# Patient Record
Sex: FEMALE | URBAN - METROPOLITAN AREA
[De-identification: names, ages, dates, MRNs, and addresses within clinical notes are randomized per-mention and may not be internally consistent; named-entity substitution may affect disease eponyms.]

---

## 2019-03-31 ENCOUNTER — HOSPITAL ENCOUNTER (INPATIENT)
Age: 59
LOS: 6 days | Discharge: HOME HEALTH | DRG: 057 | End: 2019-04-06
Attending: PHYSICAL MEDICINE & REHABILITATION | Admitting: PHYSICAL MEDICINE & REHABILITATION
Payer: COMMERCIAL

## 2019-03-31 PROBLEM — I63.9 ACUTE ISCHEMIC STROKE (HCC): Status: ACTIVE | Noted: 2019-03-31

## 2019-03-31 PROCEDURE — 94761 N-INVAS EAR/PLS OXIMETRY MLT: CPT

## 2019-03-31 PROCEDURE — 6370000000 HC RX 637 (ALT 250 FOR IP): Performed by: PHYSICAL MEDICINE & REHABILITATION

## 2019-03-31 PROCEDURE — 94664 DEMO&/EVAL PT USE INHALER: CPT

## 2019-03-31 PROCEDURE — 1280000000 HC REHAB R&B

## 2019-03-31 PROCEDURE — 94760 N-INVAS EAR/PLS OXIMETRY 1: CPT

## 2019-03-31 PROCEDURE — 6360000002 HC RX W HCPCS: Performed by: PHYSICAL MEDICINE & REHABILITATION

## 2019-03-31 RX ORDER — ACETAMINOPHEN 325 MG/1
650 TABLET ORAL EVERY 4 HOURS PRN
Status: DISCONTINUED | OUTPATIENT
Start: 2019-03-31 | End: 2019-04-06 | Stop reason: HOSPADM

## 2019-03-31 RX ORDER — ALBUTEROL SULFATE 90 UG/1
2 AEROSOL, METERED RESPIRATORY (INHALATION) EVERY 6 HOURS PRN
Status: DISCONTINUED | OUTPATIENT
Start: 2019-03-31 | End: 2019-04-06 | Stop reason: HOSPADM

## 2019-03-31 RX ORDER — FLECAINIDE ACETATE 100 MG/1
50 TABLET ORAL EVERY 12 HOURS SCHEDULED
Status: DISCONTINUED | OUTPATIENT
Start: 2019-03-31 | End: 2019-04-06 | Stop reason: HOSPADM

## 2019-03-31 RX ORDER — PANTOPRAZOLE SODIUM 40 MG/1
40 TABLET, DELAYED RELEASE ORAL
Status: DISCONTINUED | OUTPATIENT
Start: 2019-04-01 | End: 2019-04-06 | Stop reason: HOSPADM

## 2019-03-31 RX ORDER — HYDROCORTISONE 10 MG/1
10 TABLET ORAL DAILY
COMMUNITY

## 2019-03-31 RX ORDER — HYDROCORTISONE 10 MG/1
10 TABLET ORAL
Status: DISCONTINUED | OUTPATIENT
Start: 2019-04-01 | End: 2019-04-06 | Stop reason: HOSPADM

## 2019-03-31 RX ORDER — FLUTICASONE PROPIONATE 50 MCG
1 SPRAY, SUSPENSION (ML) NASAL DAILY
Status: DISCONTINUED | OUTPATIENT
Start: 2019-04-01 | End: 2019-04-03

## 2019-03-31 RX ORDER — FLECAINIDE ACETATE 50 MG/1
50 TABLET ORAL 2 TIMES DAILY
COMMUNITY

## 2019-03-31 RX ORDER — ATORVASTATIN CALCIUM 40 MG/1
40 TABLET, FILM COATED ORAL NIGHTLY
Status: DISCONTINUED | OUTPATIENT
Start: 2019-03-31 | End: 2019-04-06 | Stop reason: HOSPADM

## 2019-03-31 RX ORDER — FOLIC ACID 1 MG/1
1 TABLET ORAL DAILY
COMMUNITY

## 2019-03-31 RX ORDER — TRAZODONE HYDROCHLORIDE 50 MG/1
50 TABLET ORAL NIGHTLY PRN
Status: DISCONTINUED | OUTPATIENT
Start: 2019-03-31 | End: 2019-04-06 | Stop reason: HOSPADM

## 2019-03-31 RX ORDER — CLONAZEPAM 0.5 MG/1
0.5 TABLET ORAL 2 TIMES DAILY PRN
COMMUNITY

## 2019-03-31 RX ORDER — METOPROLOL SUCCINATE 25 MG/1
12.5 TABLET, EXTENDED RELEASE ORAL 2 TIMES DAILY
Status: ON HOLD | COMMUNITY
End: 2019-03-31 | Stop reason: CLARIF

## 2019-03-31 RX ORDER — HYDROCORTISONE 10 MG/1
20 TABLET ORAL DAILY
Status: DISCONTINUED | OUTPATIENT
Start: 2019-04-01 | End: 2019-04-06 | Stop reason: HOSPADM

## 2019-03-31 RX ORDER — HYDRALAZINE HYDROCHLORIDE 50 MG/1
50 TABLET, FILM COATED ORAL EVERY 6 HOURS PRN
Status: DISCONTINUED | OUTPATIENT
Start: 2019-03-31 | End: 2019-04-06 | Stop reason: HOSPADM

## 2019-03-31 RX ORDER — ACETAMINOPHEN 325 MG/1
650 TABLET ORAL EVERY 6 HOURS PRN
COMMUNITY

## 2019-03-31 RX ORDER — CLONAZEPAM 0.5 MG/1
0.5 TABLET ORAL 2 TIMES DAILY
Status: DISCONTINUED | OUTPATIENT
Start: 2019-03-31 | End: 2019-04-06 | Stop reason: HOSPADM

## 2019-03-31 RX ORDER — BUDESONIDE AND FORMOTEROL FUMARATE DIHYDRATE 160; 4.5 UG/1; UG/1
2 AEROSOL RESPIRATORY (INHALATION) 2 TIMES DAILY
COMMUNITY

## 2019-03-31 RX ORDER — ONDANSETRON 4 MG/1
8 TABLET, FILM COATED ORAL EVERY 8 HOURS PRN
Status: DISCONTINUED | OUTPATIENT
Start: 2019-03-31 | End: 2019-04-06 | Stop reason: HOSPADM

## 2019-03-31 RX ORDER — HYDROCORTISONE 10 MG/1
10 TABLET ORAL DAILY
Status: ON HOLD | COMMUNITY
End: 2019-03-31 | Stop reason: CLARIF

## 2019-03-31 RX ORDER — PROCHLORPERAZINE MALEATE 10 MG
10 TABLET ORAL EVERY 6 HOURS PRN
COMMUNITY

## 2019-03-31 RX ORDER — FOLIC ACID 1 MG/1
1 TABLET ORAL DAILY
Status: DISCONTINUED | OUTPATIENT
Start: 2019-04-01 | End: 2019-04-06 | Stop reason: HOSPADM

## 2019-03-31 RX ORDER — ATORVASTATIN CALCIUM 40 MG/1
40 TABLET, FILM COATED ORAL NIGHTLY
Status: ON HOLD | COMMUNITY
End: 2019-04-05 | Stop reason: SDUPTHER

## 2019-03-31 RX ORDER — ALBUTEROL SULFATE 90 UG/1
2 AEROSOL, METERED RESPIRATORY (INHALATION) EVERY 6 HOURS PRN
COMMUNITY

## 2019-03-31 RX ORDER — FLUTICASONE PROPIONATE 50 MCG
1 SPRAY, SUSPENSION (ML) NASAL DAILY
COMMUNITY

## 2019-03-31 RX ORDER — HYDROCORTISONE 10 MG/1
20 TABLET ORAL EVERY MORNING
COMMUNITY

## 2019-03-31 RX ORDER — MULTIVITAMIN WITH FOLIC ACID 400 MCG
1 TABLET ORAL DAILY
Status: DISCONTINUED | OUTPATIENT
Start: 2019-04-01 | End: 2019-04-06 | Stop reason: HOSPADM

## 2019-03-31 RX ORDER — LIDOCAINE AND PRILOCAINE 25; 25 MG/G; MG/G
CREAM TOPICAL PRN
COMMUNITY

## 2019-03-31 RX ADMIN — ATORVASTATIN CALCIUM 40 MG: 40 TABLET, FILM COATED ORAL at 22:15

## 2019-03-31 RX ADMIN — METOPROLOL TARTRATE 12.5 MG: 25 TABLET ORAL at 22:15

## 2019-03-31 RX ADMIN — MOMETASONE FUROATE AND FORMOTEROL FUMARATE DIHYDRATE 2 PUFF: 200; 5 AEROSOL RESPIRATORY (INHALATION) at 19:43

## 2019-03-31 RX ADMIN — ENOXAPARIN SODIUM 80 MG: 80 INJECTION SUBCUTANEOUS at 22:16

## 2019-03-31 RX ADMIN — CLONAZEPAM 0.5 MG: 0.5 TABLET ORAL at 22:16

## 2019-03-31 RX ADMIN — FLECAINIDE ACETATE 50 MG: 100 TABLET ORAL at 22:16

## 2019-03-31 ASSESSMENT — PAIN SCALES - GENERAL: PAINLEVEL_OUTOF10: 0

## 2019-03-31 NOTE — PROGRESS NOTES
After further questions, unsure if patient's answers are correct, will need to confirm with family. Able to demonstrate proper use of call light.  Patient given menu and instructed to [employer] items for meal.

## 2019-03-31 NOTE — PROGRESS NOTES
Sister here, states patient's daughter will not be in. Mother and other sister here. Patient did not [place] what items she wanted for meal, sister trying to get order. Explained to sister may need LORIN cam, sister does not want her to have, also reading fall contract in detail. Sister wants patient's daughter to have copy. Reviewed with family therapy, rehab program, discharge plan.

## 2019-03-31 NOTE — PROGRESS NOTES
Brought in by ambulance service, patient already standing at bedside, tried to review safety issues to call. Patient unable to answer questions, unclear if cognition or aphasia. Patient denies pain. Patient answering with yes/no. No family here at this time.

## 2019-03-31 NOTE — PROGRESS NOTES
Family oriented to the Call Light, Phone, TV, Thermostat, Bed Controls, Bathroom and Emergency Cord. Family verbalized and demonstrated understanding of all. Patient was also given an over view of Unit Routines for Acute Rehab, including what to wear for therapy. The patient's role in goal setting was reviewed along with an explanation of the Interdisciplinary Team meeting, the 's role in coordinating services and the Discharge Planning/Continuum of Care process. Patient Rights and Responsibilities were reviewed. Meal times were explained, including how to order food. The white board (used for communication) was pointed out emphasizing  the 3 hours/day Therapy Schedule (posted most evenings), the number (and process) for reporting grievances, and the Doctor's, Nurse's, and PCA's names. It was recommended that any family that will be care givers or any care givers the patient has, take part in therapy. There are no set visiting hours, and it was suggested that non-caregiver friends and family visitors come after therapy (at 4 PM or later) to allow patient to rest in between sessions.

## 2019-03-31 NOTE — PROGRESS NOTES
4 Eyes Skin Assessment     The patient is being assess for  Admission    I agree that 2 RN's have performed a thorough Head to Toe Skin Assessment on the patient. ALL assessment sites listed below have been assessed. Areas assessed by both nurses: Barbie Brisa  [x]   Head, Face, and Ears   [x]   Shoulders, Back, and Chest  [x]   Arms, Elbows, and Hands   [x]   Coccyx, Sacrum, and IschIum  [x]   Legs, Feet, and Heels        Does the Patient have Skin Breakdown?   No         Leonard Prevention initiated:  No   Wound Care Orders initiated:  No      [facility] nurse consulted for Pressure Injury (Stage 3,4, Unstageable, DTI, NWPT, and Complex wounds), New and Established Ostomies:  No      Nurse 1 eSignature: Electronically signed by Yuli Kemp RN on 3/31/19 at 7:08 PM    **SHARE this note so that the co-signing nurse is able to place an eSignature**    Nurse 2 eSignature: Electronically signed by Isabelle Oneill RN on 4/1/19 at 8:26 AM

## 2019-04-01 LAB
A/G RATIO: 1.5 (ref 1.1–2.2)
ALBUMIN SERPL-MCNC: 3.9 G/DL (ref 3.4–5)
ALP BLD-CCNC: 73 U/L (ref 40–129)
ALT SERPL-CCNC: 15 U/L (ref 10–40)
ANION GAP SERPL CALCULATED.3IONS-SCNC: 9 MMOL/L (ref 3–16)
AST SERPL-CCNC: 17 U/L (ref 15–37)
BASOPHILS ABSOLUTE: 0.1 K/UL (ref 0–0.2)
BASOPHILS RELATIVE PERCENT: 1 %
BILIRUB SERPL-MCNC: 0.3 MG/DL (ref 0–1)
BUN BLDV-MCNC: 11 MG/DL (ref 7–20)
CALCIUM SERPL-MCNC: 9.4 MG/DL (ref 8.3–10.6)
CHLORIDE BLD-SCNC: 105 MMOL/L (ref 99–110)
CO2: 28 MMOL/L (ref 21–32)
CREAT SERPL-MCNC: 0.5 MG/DL (ref 0.6–1.1)
EOSINOPHILS ABSOLUTE: 0.2 K/UL (ref 0–0.6)
EOSINOPHILS RELATIVE PERCENT: 4.5 %
GFR AFRICAN AMERICAN: >60
GFR NON-AFRICAN AMERICAN: >60
GLOBULIN: 2.6 G/DL
GLUCOSE BLD-MCNC: 101 MG/DL (ref 70–99)
HCT VFR BLD CALC: 34.4 % (ref 36–48)
HEMOGLOBIN: 11.7 G/DL (ref 12–16)
LYMPHOCYTES ABSOLUTE: 1.4 K/UL (ref 1–5.1)
LYMPHOCYTES RELATIVE PERCENT: 26.2 %
MCH RBC QN AUTO: 31.1 PG (ref 26–34)
MCHC RBC AUTO-ENTMCNC: 34.1 G/DL (ref 31–36)
MCV RBC AUTO: 91.3 FL (ref 80–100)
MONOCYTES ABSOLUTE: 0.5 K/UL (ref 0–1.3)
MONOCYTES RELATIVE PERCENT: 9.3 %
NEUTROPHILS ABSOLUTE: 3.2 K/UL (ref 1.7–7.7)
NEUTROPHILS RELATIVE PERCENT: 59 %
PDW BLD-RTO: 12.6 % (ref 12.4–15.4)
PLATELET # BLD: 304 K/UL (ref 135–450)
PMV BLD AUTO: 7.5 FL (ref 5–10.5)
POTASSIUM REFLEX MAGNESIUM: 4.1 MMOL/L (ref 3.5–5.1)
RBC # BLD: 3.77 M/UL (ref 4–5.2)
SODIUM BLD-SCNC: 142 MMOL/L (ref 136–145)
TOTAL PROTEIN: 6.5 G/DL (ref 6.4–8.2)
WBC # BLD: 5.4 K/UL (ref 4–11)

## 2019-04-01 PROCEDURE — 97530 THERAPEUTIC ACTIVITIES: CPT

## 2019-04-01 PROCEDURE — 94640 AIRWAY INHALATION TREATMENT: CPT

## 2019-04-01 PROCEDURE — 6370000000 HC RX 637 (ALT 250 FOR IP): Performed by: PHYSICAL MEDICINE & REHABILITATION

## 2019-04-01 PROCEDURE — 97530 THERAPEUTIC ACTIVITIES: CPT | Performed by: PHYSICAL THERAPIST

## 2019-04-01 PROCEDURE — 85025 COMPLETE CBC W/AUTO DIFF WBC: CPT

## 2019-04-01 PROCEDURE — 6360000002 HC RX W HCPCS: Performed by: PHYSICAL MEDICINE & REHABILITATION

## 2019-04-01 PROCEDURE — 97116 GAIT TRAINING THERAPY: CPT | Performed by: PHYSICAL THERAPIST

## 2019-04-01 PROCEDURE — 97162 PT EVAL MOD COMPLEX 30 MIN: CPT | Performed by: PHYSICAL THERAPIST

## 2019-04-01 PROCEDURE — 1280000000 HC REHAB R&B

## 2019-04-01 PROCEDURE — 80053 COMPREHEN METABOLIC PANEL: CPT

## 2019-04-01 PROCEDURE — 97535 SELF CARE MNGMENT TRAINING: CPT

## 2019-04-01 PROCEDURE — 97166 OT EVAL MOD COMPLEX 45 MIN: CPT

## 2019-04-01 PROCEDURE — 36415 COLL VENOUS BLD VENIPUNCTURE: CPT

## 2019-04-01 PROCEDURE — 94760 N-INVAS EAR/PLS OXIMETRY 1: CPT

## 2019-04-01 RX ADMIN — PANTOPRAZOLE SODIUM 40 MG: 40 TABLET, DELAYED RELEASE ORAL at 06:12

## 2019-04-01 RX ADMIN — FLECAINIDE ACETATE 50 MG: 100 TABLET ORAL at 08:26

## 2019-04-01 RX ADMIN — METOPROLOL TARTRATE 12.5 MG: 25 TABLET ORAL at 21:39

## 2019-04-01 RX ADMIN — THERA TABS 1 TABLET: TAB at 08:26

## 2019-04-01 RX ADMIN — MOMETASONE FUROATE AND FORMOTEROL FUMARATE DIHYDRATE 2 PUFF: 200; 5 AEROSOL RESPIRATORY (INHALATION) at 12:05

## 2019-04-01 RX ADMIN — FOLIC ACID 1 MG: 1 TABLET ORAL at 08:26

## 2019-04-01 RX ADMIN — HYDROCORTISONE 10 MG: 10 TABLET ORAL at 12:44

## 2019-04-01 RX ADMIN — HYDROCORTISONE 20 MG: 10 TABLET ORAL at 08:27

## 2019-04-01 RX ADMIN — ACETAMINOPHEN 650 MG: 325 TABLET, FILM COATED ORAL at 18:21

## 2019-04-01 RX ADMIN — ATORVASTATIN CALCIUM 40 MG: 40 TABLET, FILM COATED ORAL at 21:37

## 2019-04-01 RX ADMIN — METOPROLOL TARTRATE 12.5 MG: 25 TABLET ORAL at 08:25

## 2019-04-01 RX ADMIN — ENOXAPARIN SODIUM 80 MG: 80 INJECTION SUBCUTANEOUS at 08:26

## 2019-04-01 RX ADMIN — CLONAZEPAM 0.5 MG: 0.5 TABLET ORAL at 21:37

## 2019-04-01 RX ADMIN — MOMETASONE FUROATE AND FORMOTEROL FUMARATE DIHYDRATE 2 PUFF: 200; 5 AEROSOL RESPIRATORY (INHALATION) at 20:08

## 2019-04-01 RX ADMIN — CLONAZEPAM 0.5 MG: 0.5 TABLET ORAL at 08:25

## 2019-04-01 RX ADMIN — FLECAINIDE ACETATE 50 MG: 100 TABLET ORAL at 21:38

## 2019-04-01 RX ADMIN — ENOXAPARIN SODIUM 80 MG: 80 INJECTION SUBCUTANEOUS at 21:40

## 2019-04-01 ASSESSMENT — 9 HOLE PEG TEST
TESTTIME_SECONDS: 24.49
TESTTIME_SECONDS: 24.28

## 2019-04-01 ASSESSMENT — PAIN SCALES - GENERAL
PAINLEVEL_OUTOF10: 0
PAINLEVEL_OUTOF10: 3
PAINLEVEL_OUTOF10: 2
PAINLEVEL_OUTOF10: 2

## 2019-04-01 ASSESSMENT — PAIN DESCRIPTION - DESCRIPTORS
DESCRIPTORS: ACHING
DESCRIPTORS: ACHING

## 2019-04-01 ASSESSMENT — PAIN DESCRIPTION - LOCATION
LOCATION: GROIN
LOCATION: GROIN

## 2019-04-01 ASSESSMENT — PAIN DESCRIPTION - PAIN TYPE
TYPE: ACUTE PAIN
TYPE: ACUTE PAIN

## 2019-04-01 ASSESSMENT — PAIN DESCRIPTION - FREQUENCY
FREQUENCY: INTERMITTENT
FREQUENCY: INTERMITTENT

## 2019-04-01 NOTE — PROGRESS NOTES
Occupational Therapy   Occupational Therapy Initial Assessment  Date: 2019   Patient Name: Matias Rcihard  MRN: [id number]     : 1960    Date of Service: 2019    Discharge Recommendations:  Home with assist PRN, 24 hour supervision or assist, Continue to assess pending progress       Assessment   Performance deficits / Impairments: Decreased functional mobility ; Decreased safe awareness;Decreased high-level IADLs;Decreased ADL status; Decreased balance  Assessment: Patient was independent prior to admission and worked full time, she lives with her daughter and her family. Patient presents after experiencing a stroke and is currently receiving treatment for lung cancer. Currently she demonstrates aphasia, expressive/word finding problems and some receptive. She is able to follow commands consistently. Requires OT intervention prior to d/c home with family. Treatment Diagnosis: Decreased adl, communication, functional mobility  Prognosis: Good  Decision Making: Medium Complexity  History: see above  Exam: ADLS, 9 hole peg test, dynameter, functional mobility  Assistance / Modification: supervision  Patient Education: role of OT, rehab schedule, d/c plan  Barriers to Learning: aphasia  REQUIRES OT FOLLOW UP: Yes  Activity Tolerance  Activity Tolerance: Patient Tolerated treatment well  Safety Devices  Safety Devices in place: Not Applicable(taken to PT)           Patient Diagnosis(es): There were no encounter diagnoses. has no past medical history on file. has no past surgical history on file. Treatment Diagnosis: Decreased adl, communication, functional mobility      Restrictions  Restrictions/Precautions  Restrictions/Precautions: Fall Risk  Position Activity Restriction  Other position/activity restrictions: aphasia, exp and poss receptive    Subjective   General  Chart Reviewed:  Yes  Additional Pertinent Hx: Lung CA with bone mets (poss at C4), COPD, A fib, PE  Family / Caregiver Present: No  Referring Practitioner: Dr. Viry Pretty  Diagnosis: Stroke; L M2 occlusion and thrombectomy  Subjective  Subjective: Agreed to OT eval, shower  General Comment  Comments: Goes by Elia Medellin  Pain Assessment  Pain Assessment: 0-10  Pain Level: 0  Oxygen Therapy  SpO2: 100 %  O2 Device: None (Room air)     Social/Functional History  Social/Functional History  Lives With: Daughter(daughter, Francis Pérez (works  in home), OBED and 3 granddtrs)  Type of Home: House  Home Layout: Laundry in basement, Two level, Bed/Bath upstairs  Home Access: Stairs to enter without rails  Entrance Stairs - Number of Steps: 1  Bathroom Shower/Tub: Tub/Shower unit  ADL Assistance: Independent  Homemaking Assistance: Independent(\"anybody\" cooks)  Homemaking Responsibilities: Yes  Ambulation Assistance: Independent  Transfer Assistance: Independent  Active : Yes  Mode of Transportation: Car  Occupation: Full time employment  Type of occupation: Works, full time  [address]: out to eat, visit with sister, [facility]  Additional Comments: No DME per pt       Objective   Vision: Impaired  Vision Exceptions: Wears glasses at all times  Hearing: Within functional limits    Orientation  Overall Orientation Status: (need to formally assess- States \"hospital\", acknowledges stroke, knows name of self and family, stated date \"Jan 2, 2019\" but with aphasia)     Balance  Sitting Balance: Stand by assistance  Standing Balance: Stand by assistance  Standing Balance  Sit to stand: Stand by assistance  Stand to sit: Stand by assistance  Functional Mobility  Functional - Mobility Device: No device  Assist Level: Stand by assistance  Shower Transfers  Shower - Transfer From: (no device)  Shower - Transfer Type: To and From  Shower - Transfer To:  Standing  Shower - Technique: Ambulating  Shower Transfers: Stand by assistance  ADL  Feeding: Independent  Grooming: Supervision  UE Bathing: Stand by assistance  LE Bathing: Stand by assistance  UE Dressing: Stand by assistance  LE Dressing: Stand by assistance  Additional Comments: Stood in shower for bathing, dressed LB while standing, No LOB  Tone RUE  RUE Tone: Normotonic  Tone LUE  LUE Tone: Normotonic     Bed mobility  Supine to Sit: Stand by assistance  Transfers  Sit to stand: Stand by assistance  Stand to sit: Stand by assistance  Vision - Basic Assessment  Prior Vision: Wears glasses all the time  Cognition  Overall Cognitive Status: WFL  Cognition Comment: need cont assess due to aphasia        Sensation  Overall Sensation Status: WNL        LUE AROM (degrees)  LUE AROM : WFL  RUE AROM (degrees)  RUE AROM : WFL  LUE Strength  Gross LUE Strength: WFL  RUE Strength  Gross RUE Strength: WFL     Left Hand Strength -  (lbs)  Handle Setting 1: 63, 60, 65  Right Hand Strength -  (lbs)  Handle Setting 1: 72, 73, 85  Fine Motor Skills  Left 9 Hole Peg Test Time (secs): 24.28  Right 9 Hole Peg Test Time (secs): 24.49             Plan   Plan  Times per week: 5-6 x week  Times per day: Twice a day  Plan weeks: 1  Current Treatment Recommendations: Neuromuscular Re-education, Patient/Caregiver Education & Training, Equipment Evaluation, Education, & procurement, Self-Care / ADL, Safety Education & Training, Home Management Training, Cognitive/Perceptual Training, Functional Mobility Training         Goals  Short term goals  Time Frame for Short term goals: 1 week  Short term goal 1: Pt will be Independent with functional transfers  Short term goal 2: Pt will be Independent with functional mobility  Short term goal 3: Pt will be Independent with toileting  Short term goal 4: Pt will be Independent with bathing and dressing  Short term goal 5: Pt will be Independent with light meal prep  Short term goal 6: Pt will undergo further cognition testing/home safety evaluation/tx to meet above goals  Long term goals  Time Frame for Long term goals : STG = LTG  Patient Goals   Patient goals : \"to get better\"

## 2019-04-01 NOTE — CARE COORDINATION
ROBBIEW reviewed chart. LSW met with patient to introduce  role, initiate discussion regarding dc planning and to inform of weekly review of progress during Team Conferences to be held on . She prefers to be called Silvano Thomas. She does give permission to speak with her daughter or sister for updates on progress and dc planning. Call placed to Dgtr, Adrian Landry for this assessment. SOCIAL WORK ASSESSMENT      GOAL:    To return to dgtr's house upon discharge only if she is able to care for herself. HOME SITUATION:  Pt used to live in [place] with her spouse. Her spouse  of lung cancer a few months ago, she moved in with her dgtr, son in law and three granddgtrs. Her personal house is up for sale. 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 [employer] has paperwork from her employer and LSW provided her with worker's email address so she can forward to me to get to Dr Heather Villagran. [employer] runs a  Program in her home and states pt will need to be totally independent to return home as she will not be able to caregive to her as she is caring for the children. PRIOR LEVEL OF FUNCTIONING:       PERSONAL CARE:    Totally independent                                                                       DRIVES:   yes                                                                     FINANCES:   independent                                                                   MEALS:       independnt                          GROCERY SHOPS:      DME CURRENTLY AT HOME:  none      CURRENT HOME CARE/SERVICES:  None. PREFERRED HOME CARE:   To be determined. TEAM CONFERENCE DAY:  ROBBIEW informed dgtr of weekly review of [facility] which is a staff only event. Team will review her progress, DME recommendations and DC date. This worker will call dgtr to give updates.     [facility] reports pt is wanting to be discharged as she gets an infusion every three weeks:  Janee Mckeon and it was supposed to be given Monday, 3- but she missed it. Dgtr reports she would calm down a little bit if she were able to get this infusion which is given by Dr Yovani Box at [facility]. She does have a PCP, Dr Zaida Cook from [facility]. LSW reviewed pt's two upcoming appt:    Friday, 4-5-2019  To neurosurgery.     Monday 4-8-2019     Echo    [place], [place]     Case Management   [phone]    4/2/2019  9:59 AM

## 2019-04-01 NOTE — CONSULTS
Nutrition Assessment (Low Risk)    Type and Reason for Visit: Initial, Consult    Nutrition Recommendations:   Dys 3 diet, texture/liquid level per SLP  Will monitor nutritional adequacy, nutrition-related labs, weights, BMs, and clinical progress     Nutrition Assessment:  Patient assessed for nutritional risk. Deemed to be at low risk at this time. Will continue to monitor for changes in status.       Malnutrition Assessment:  · Malnutrition Status: No malnutrition    Nutrition Risk Level   Risk Level: Low    Nutrition Diagnosis:   · Problem: No nutrition diagnosis at this time    Nutrition Intervention:  Food and/or Delivery: Continue current diet  Nutrition Education/Counseling/Coordination of Care:  Continued Inpatient Monitoring      Electronically signed by Hedy Salazar RD, LD on 4/1/19 at 1:50 PM    Contact Number: [phone]

## 2019-04-01 NOTE — PLAN OF CARE
ARU PATIENT TREATMENT PLAN  [facility]   [address]  [address]  [phone]    Elmer Krmarissa    : 1960  Acct #: [de-identified]  MRN: [id number]   PHYSICIAN:  Ryann Navarrete MD    Rehabilitation Diagnosis:    Left MCA infarction-right hemiparesis, and aphasia-Lovenox       Metastatic adenocarcinoma of lung-keytruda      COPD-Dulera ,      A. Fib-flecainide, Lopressor      low cortisol and ACTH-continue home hydrocortisone.     HLD-Lipitor      GERD-Protonix       thyroid nodule-benign.     Bowels: Schedule Miralax + Senna S. Follow bowel movements. Enema or suppository if needed.      Bladder: Check PVR x 3. [address] if PVR > 200ml or if any volume is > 500 ml.      Pain: Tylenol is ordered prn          ADMIT DATE:3/31/2019    Patient Goals: To return home. Admitting Impairments: Decreased functional mobility ; Decreased safe awareness;Decreased high-level IADLs;Decreased ADL status; Decreased balance  Barriers: medical co-morbidities, decreased balanced, decreased insight, weakness   Participation: prior to admission patient lived with daughter and was independent, working full time. She enjoys going out to eat, visiting with her sister.        CARE PLAN     NURSING:  Elmer Ashley while on this unit will:     [x] Be continent of bowel and bladder     [x] Have an adequate number of bowel movements  [x] Urinate with no urinary retention >300ml in bladder  [] Complete bladder protocol with monroe removal  [x] Maintain O2 SATs at 92%  [x] Have pain managed while on ARU       [] Be pain free by discharge   [x] Have no skin breakdown while on ARU  [] Have improved skin integrity via wound measurements  [] Have no signs/symptoms of infection at the wound site  [x] Be free from injury during hospitalization  [x] Complete education with patient/family with understanding demonstrated for:  [x] Adjustment   [x] Other: CVA, communication needs   Nursing interventions may include bowel/bladder training, education for medical assistive devices, medication education, O2 saturation management, energy conservation, stress management techniques, fall prevention, alarms protocol, seating and positioning, skin/wound care, pressure relief instruction,dressing changes,  infection protection, DVT prophylaxis, and/or assistance with in room safety with transfers to bed, toilet, wheelchair, shower as well as bathroom activities and hygiene. Patient/caregiver education for:   [x] Disease/sustained injury/management      [x] Medication Use   [] Surgical intervention   [x] Safety   [x] Body mechanics and or joint protection   [x] Health maintenance         PHYSICAL THERAPY:  Goals:                  Short term goals  Time Frame for Short term goals: 5-7 days  Short term goal 1: transfers independent including bed mobility  Short term goal 2: ambulate community distances without AD independently  Short term goal 3: performed 12 steps with rail with MI  Short term goal 4: curb step with MI            Long term goals  Time Frame for Long term goals : STG = LTG  These goals were reviewed with this patient at the time of assessment and Miguel Murray is in agreement. Plan of Care: Pt to be seen 90   mins per day for 5-6 day/week 1 week.                    Current Treatment Recommendations: Strengthening, Balance Training, Functional Mobility Training, Transfer Training, Endurance Training, Gait Training, Stair training, Safety Education & Training, Home Exercise Program, Patient/Caregiver Education & Training, Equipment Evaluation, Education, & procurement      OCCUPATIONAL THERAPY:  Goals:             Short term goals  Time Frame for Short term goals: 1 week  Short term goal 1: Pt will be Independent with functional transfers  Short term goal 2: Pt will be Independent with functional mobility  Short term goal 3: Pt will be Independent with toileting  Short term goal 4: Pt will be Independent with bathing and dressing  Short term goal 5: Pt will be Independent with light meal prep  Short term goal 6: Pt will undergo further cognition testing/home safety evaluation/tx to meet above goals :  Long term goals  Time Frame for Long term goals : STG = LTG :    These goals were reviewed with this patient at the time of assessment and Mickey Roy is in agreement    Plan of Care:  Pt to be seen 90  mins per day for 5-6 day/week 1 week. Patient Education: role of OT, rehab schedule, d/c plan      SPEECH THERAPY: Goals will be left blank if speech is not following this patient. Goals:                                                         Short-term Goals   Pt was unable to convey goals but to get talking better. Goal 1: Pt will demonstrate improved language processing at concrete sentence level for Y/N responses with <moderate clarification  Goal 2: Pt will demonstrate improved processing for one step commands (body scheme comands; psychomotor commands) with < max visual demonstrations  Goal 3: Pt will verbally express basic biographical information wtih <max assist  Goal 4: PT will demonstrate improved word retrieval for varied naming tasks and word retrieval and thought organization for concrete concept explanation with < moderate              These goals were reviewed with this patient at the time of assessment and Mickey Roy is in agreement    Plan of Care: Pt to be seen 45-90 mins per day for 5 day/week 1 weeks. Pt will need continued therapy at d/c          CASE MANAGEMENT:  Goals:   Assist patient/family with discharge planning, patient/family counseling,   and coordination with insurance during ARU stay.       Admission Period/Goal FIM SCORES  FIM Admit/Goal Score   Eating/Swallowing 5/7   Grooming 5/7   Bathing 5/7   Upper Body Dressing 5/7   Lower Body Dressing 5/7   Toileting 5/7   Bladder [place], Accidents = FIM 6/7   Bowel  [place], Accidents = FIM 6/7   Bed/Chair Transfer 5/7   Toilet Transfer known medical history of atrial fibrillation, metastatic adenocarcinoma of the left lung, on chemotherapy who developed the onset of right-sided weakness. Diagnosis of a left middle cerebral artery stroke was made, but no TPA was given since she was on Eliquis. CTA of the brain revealed a left MCA occlusion. Patient was taken to interventional radiology for thrombectomy. MRI scan revealed a left MCA distribution infarct. Patient was started on systemic Lovenox 7 days post stroke. Her aspirin was discontinued. Patient was found to have a mass in her heart on echocardiogram in September, 2018, and this condition disappeared on repeat echo in December, 2018 after she was treated with chemotherapy and anticoagulation. Patient scheduled for a repeat OC on April 4. Bilateral lower extremity Doppler revealed no DVT. Repeat CT scan of the chest revealed her known lung cancer, and oncology recommended Lovenox for anticoagulation treatment at this time. Patient had been taking Keytruda for her lung cancer. Patient started therapies, but needs more therapy before discharged to home so we can approve her right-sided strength and self-care activities. She also needs speech for treatment of her aphasia. Patient also has diagnosis of COPD, A. Fib, GERD, and known thyroid nodule-benign. She currently is on a dysphagia 3 diet with liquids        I have reviewed this initial plan of care and agree with its contents:    Title   Name    Date    Time    Physician: Dr. Frandy Moffett, 4/2/19, 8 AM    Case Mgmt:  Suman Lucas [place]     Case Management   [phone]    4/1/2019  5:10 PM      OT: MAXIMILIAN Ramsey/RICARDO #[id number] 4/1/2019 5:10 PM      PT:Electronically signed by Fabiola Blackman PT [id number] on 4/1/2019 at 4:29 PM      RN: Ernesto Zuniga RN, CRRN 04/01/2019 12:10 pm    ST:Renu Gould,MS,CCC,SLP [id number] Speech and Language Pathologist signed 4/1/2019 at [phone] pm      : Shana Barclay     Other:

## 2019-04-01 NOTE — PLAN OF CARE
Problem: Falls - Risk of:  Goal: Will remain free from falls  Description  Will remain free from falls. Patient free from falls this shift. Fall precautions in place at all times. Bed in lowest position with two side rails up and wheels locked. Call light within reach. Patient able and agreeable to contact for safety appropriately. Outcome: Ongoing      Problem: SKIN INTEGRITY  Goal: LTG - Patient will be free from infection  Skin assessment performed each shift per protocol. Patient turns and repositions self. Continent  of bowel and bladder. Skin intact  Outcome: Ongoing      Problem: Pain:  Description  Pain management should include both nonpharmacologic and pharmacologic interventions. Goal: Pain level will decrease  Description  Pain level will decrease. No complaints of pain or discomfort noted.   Outcome: Ongoing

## 2019-04-01 NOTE — PROGRESS NOTES
Pt heart rate got to the 110's and switched to A-Flutter for about 1 min. Back to NSR. Patient oriented to person, place, time (after allowing time to think on how to word it, family present to aid in interpretation). Vital signs stable. Patient remains on room air only. Shift assessment completed. Medication administration completed without any complications. Patient is up with activity times 1 with a no device. Requiring Minimal contact assistance. Patients tolerating Dysphagia diet with  thin liquids. . Remains continent of bowel and bladder. No needs voiced at this time. Call light within reach. Will continue to monitor.

## 2019-04-01 NOTE — H&P
Department of [place] [facility]  Dr. Ifeanyi Gregory History & Physical      Patient Identification:  Vanessa Sin  : 1960  Admit date: 3/31/2019  Dictation date: 19   Attending provider: Herlinda Shepherd MD        Primary care provider: No primary care provider on file. Chief Complaint:   Patient Active Problem List   Diagnosis    Acute ischemic stroke ([facility] [address]       History of Present Illness/Hospital Course:  Patient is a 63-year-old female who is a known medical history of atrial fibrillation, metastatic adenocarcinoma of the left lung, on chemotherapy who developed the onset of right-sided weakness. Diagnosis of a left middle cerebral artery stroke was made, but no TPA was given since she was on Eliquis. CTA of the brain revealed a left MCA occlusion. Patient was taken to interventional radiology for thrombectomy. MRI scan revealed a left MCA distribution infarct. Patient was started on systemic Lovenox 7 days post stroke. Her aspirin was discontinued. Patient was found to have a mass in her heart on echocardiogram in , and this condition disappeared on repeat echo in  after she was treated with chemotherapy and anticoagulation. Patient scheduled for a repeat OC on . Bilateral lower extremity Doppler revealed no DVT. Repeat CT scan of the chest revealed her known lung cancer, and oncology recommended Lovenox for anticoagulation treatment at this time. Patient had been taking Keytruda for her lung cancer. Patient started therapies, but needs more therapy before discharged to home so we can approve her right-sided strength and self-care activities. She also needs speech for treatment of her aphasia. Patient also has diagnosis of COPD, A. Fib, GERD, and known thyroid nodule-benign. She currently is on a dysphagia 3 diet with liquids. Prior Level of Function:  Independent in self care and ADLs prior to admission.       Current Level of ADMISSION PHYSICIAN EVALUATION  The patient has agreed to being admitted to our comprehensive inpatient  rehabilitation facility consisting of at least 180 minutes of therapy a day,  5 out of 7 days a week. The patient/family has a good understanding of our discharge process. The  patient has potential to make improvement and is in need of at least two of  the following multidisciplinary therapies including but not limited to  physical, occupational, respiratory, and speech, nutritional services, wound care, and prosthetics and orthotics. Given the patients complex condition  and risk of further medical complications, rehabilitation services cannot be  safely provided at a lower level of care such as a skilled nursing facility. I have compared the patients medical and functional status at the time of the  preadmission screening and the same on this date, and there are no significant changes. By signing this document, I acknowledge that I have personally performed a  full physical examination on this patient within 24 hours of admission to  this inpatient rehabilitation facility and have determined the patient to be  able to tolerate the above course of treatment at an intensive level for a  reasonable period of time. I will be completing a detailed individualized  Plan of Care for this patient by day four of the patients stay based upon the  Preadmission Screen, this Post-Admission Evaluation, and the therapy  evaluations. Assessment and Plan:       Left MCA infarction-right hemiparesis, and aphasia-Lovenox      Metastatic adenocarcinoma of lung-keytruda     COPD-Dulera      A. Fib-flecainide, Lopressor     low cortisol and ACTH-continue home hydrocortisone. HLD-Lipitor     GERD-Protonix      thyroid nodule-benign. Bowels: Schedule Miralax + Senna S. Follow bowel movements. Enema or suppository if needed. Bladder: Check PVR x 3. [address] if PVR > 200ml or if any volume is > 500 ml.      Pain: Tylenol is ordered prn.        Cinthia Grider MD, 4/1/2019, 7:41 AM

## 2019-04-01 NOTE — PROGRESS NOTES
concerning for hilar and mediastinal invasion, inseparable from portions of descending thoracic aorta with possible lymphangitic spread of tumor consistent with known primary pulmonary malignancy. Direct correlation outside imaging would be helpful. Part solid nodule left lower lobe is concerning for primary pulmonary malignancy. Right upper lobe 2 mm nodule is indeterminate. Direct comparison with prior imaging would be helpful to assess for interval evolution. May represent pre-existing nodule or metastatic disease. · US Thyroid-Neck-Head: 3/26/2019  IMPRESSION:  Dominant solid nodule in the right thyroid lobe measuring up to 3.7 cm.  Dedicated tissue sampling is recommended. · RECENT RESULTS  MRI:3/24/2019  IMPRESSION:  Moderate sized acute left MCA distribution infarct as described. Susceptibility artifact in the left M2 segment consistent with thrombus. ·  has a past medical history of Smoking history. Primary Complaint:   · Comments: Pt reports speech is my problem  · MD referral due to Dysphasia    Assessment Impressions:  1. Cognitive Diagnosis: Ongoing assessment. Objective assessment is difficult due to severity of receptive and expressive language aphasia  2. Aphasia Diagnosis: Receptive and expressive language aphasia impacting all modalities. Pt's language processing improves with max visual demonstrations. Pt's verbal expression characterized by functional greetings and occasional automatics; but paraphasias, neologisms, perseveration impact expression of needs/wants. pt does attempt to use gestures to compensate. Pt was able to express basic biographical information via written expression. Pt however was unable to write to compensate for verbal expressive language problems. Pt does demonstrate reading processing at word/phrase level. Will initiate therapy  3.   Speech Diagnosis: Right facial asym noted during Oral Motor Exam. Pt 's speech is intelligible; but dysphasia errors Severe(max visual models)  Common Objects: Severe(auditory word/pictured object discrimination)  L/R Discrimination: Severe(max visual models warranted)    Reading Comprehension  Reading Status: Exceptions to [facility]  Words Impairment Severity: Moderate(66% BDAE written word to picture matching)  Phrases Impairment Severity: Moderate(limited to high frequency/concrete)  Sentence Impairment Severity: Severe    Expression  Primary Mode of Expression: Verbal  Verbal Expression  Verbal Expression: Exceptions to functional limits  Repetition: Severe(repetition and oral reading are markedly impaired)  Automatic Speech: (automatic greetings intact; some surface exchange is appropriate; occasional appropriate labeling of items; able to count; but perseveration for weekdays/months of year-kept counting with reduced awareness)  Confrontation: Severe  Conversation: (moderate to severe deficits with paraphasias; jargon; )    Written Expression  Dominant Hand: Right  Written Expression: Exceptions to [facility]  Copy Impairment Severity: (Pt is able to copy words; phrases; sentences with <15% errors)  Self formulation Impairment Severity: (self formulated for basic biographical information >60% accurate; self formulating phrases/sentences regarding pictured stimuli: limited to listing with dysgraphia errors)    Motor Speech  Motor Speech: Exceptions to [facility]  Intelligibility: (Speech is intelligible; but dysphasia noted)  Apraxia: (Potential; ongoing assessment)  Dysarthria : (Despite right facial weakness;  no significant phonemic distortions)  Oral Motor: Exceptions to [facility]  Labial ROM: Reduced right  Labial Symmetry: Abnormal symmetry right  Labial Strength: Reduced(right)  Lingual Symmetry: (very slight asym)  Lingual Coordination: Reduced(potential motor planning)    Pragmatics/Social Functioning  Pragmatics: Exceptions to [facility]  Affect: (frustration visible.  Pt with insight into some communication problems)    Cognition:

## 2019-04-01 NOTE — PROGRESS NOTES
Physical Therapy    Facility/Department: [address] REHAB  Initial Assessment AM and PM session  NAME: Carmen Potts  : 1960  MRN: [id number]    Date of Service: 2019    Discharge Recommendations:  Continue to assess pending progress   PT Equipment Recommendations  Equipment Needed: No    Assessment   Body structures, Functions, Activity limitations: Decreased strength;Decreased endurance;Decreased sensation;Decreased balance;Decreased functional mobility   Assessment: Patient is a 61 y/o female with Dx of L MCA distribution infarct and is currently being treated for lung cancer. Patient lives with daughter, OBED and grandchildren. Patient was independent and worked full time. Patient presents with global aphasia, requires gestures with repitition to understand commands. Patient requires no AD , transfers with SBA, ambulates 220' with no device with close SBA, with noticable decreased clearance of R LE with ER but strides are symmetrical. Patient able to perfrom 12 steps with rails with CGA including curb step. . Patient will benefit from to ARU to address higher level balance activities and strengthening to improve overall mobility to independent. Patient will see speech twice a day and physical therapy once a day. Treatment Diagnosis: decreased functional mobility  Specific instructions for Next Treatment: high level balance activities  Prognosis: Good  Decision Making: Medium Complexity  History: COPD, afib, GERD, thyroid nodule benign, metastatic adenocarcinoma of lung, L CVA  Exam: mobility, strength, balance, transfers, gait  Clinical Presentation: evolving  Patient Education: rehab expectations, goals  Barriers to Learning: global aphasia  REQUIRES PT FOLLOW UP: Yes  Activity Tolerance  Activity Tolerance: Patient Tolerated treatment well  Activity Tolerance: frustrated regarding communication limitations       Patient Diagnosis(es): There were no encounter diagnoses.      has no past medical history on file. has no past surgical history on file. Restrictions  Restrictions/Precautions  Restrictions/Precautions: Fall Risk  Position Activity Restriction  Other position/activity restrictions: aphasia, exp and poss receptive, telemetry  Vision/Hearing  Vision: Impaired  Vision Exceptions: Wears glasses at all times  Hearing: Within functional limits     Subjective  General  Chart Reviewed: Yes  Additional Pertinent Hx: Per Dr. Norbert Joyce,  26-year-old female who is a known medical history of atrial fibrillation, metastatic adenocarcinoma of the left lung, on chemotherapy who developed the onset of right-sided weakness. Diagnosis of a left middle cerebral artery stroke was made, but no TPA was given since she was on Eliquis. CTA of the brain revealed a left MCA occlusion. Patient was taken to interventional radiology for thrombectomy. MRI scan revealed a left MCA distribution infarct. Patient was started on systemic Lovenox 7 days post stroke. Her aspirin was discontinued. Patient was found to have a mass in her heart on echocardiogram in September, 2018, and this condition disappeared on repeat echo in December, 2018 after she was treated with chemotherapy and anticoagulation. Patient scheduled for a repeat OC on April 4. Bilateral lower extremity Doppler revealed no DVT. Repeat CT scan of the chest revealed her known lung cancer, and oncology recommended Lovenox for anticoagulation treatment at this time. Patient had been taking Keytruda for her lung cancer. Patient also has diagnosis of COPD, A. Fib, GERD, and known thyroid nodule-benign. Response To Previous Treatment: Not applicable  Family / Caregiver Present: No  Referring Practitioner: Dr. Kirstin Joyce  Referral Date : 03/31/19  Diagnosis: CVA  Follows Commands: Within Functional Limits  Subjective  Subjective: Patient has difficulty with expressing self, reorts no pain, unable to desciber job responsibilities.   Pain Screening  Patient Currently

## 2019-04-02 PROCEDURE — 97530 THERAPEUTIC ACTIVITIES: CPT

## 2019-04-02 PROCEDURE — 97535 SELF CARE MNGMENT TRAINING: CPT

## 2019-04-02 PROCEDURE — 92610 EVALUATE SWALLOWING FUNCTION: CPT

## 2019-04-02 PROCEDURE — 6370000000 HC RX 637 (ALT 250 FOR IP): Performed by: PHYSICAL MEDICINE & REHABILITATION

## 2019-04-02 PROCEDURE — 94761 N-INVAS EAR/PLS OXIMETRY MLT: CPT

## 2019-04-02 PROCEDURE — 97127 HC OT THER IVNTJ W/FOCUS COG FUNCJ: CPT

## 2019-04-02 PROCEDURE — 97116 GAIT TRAINING THERAPY: CPT | Performed by: PHYSICAL THERAPIST

## 2019-04-02 PROCEDURE — 92507 TX SP LANG VOICE COMM INDIV: CPT

## 2019-04-02 PROCEDURE — 97530 THERAPEUTIC ACTIVITIES: CPT | Performed by: PHYSICAL THERAPIST

## 2019-04-02 PROCEDURE — 94640 AIRWAY INHALATION TREATMENT: CPT

## 2019-04-02 PROCEDURE — 1280000000 HC REHAB R&B

## 2019-04-02 PROCEDURE — 6360000002 HC RX W HCPCS: Performed by: PHYSICAL MEDICINE & REHABILITATION

## 2019-04-02 PROCEDURE — 97127 HC SP THER IVNTJ W/FOCUS COG FUNCJ: CPT

## 2019-04-02 RX ADMIN — ATORVASTATIN CALCIUM 40 MG: 40 TABLET, FILM COATED ORAL at 21:40

## 2019-04-02 RX ADMIN — ENOXAPARIN SODIUM 80 MG: 80 INJECTION SUBCUTANEOUS at 21:40

## 2019-04-02 RX ADMIN — HYDROCORTISONE 20 MG: 10 TABLET ORAL at 08:21

## 2019-04-02 RX ADMIN — MOMETASONE FUROATE AND FORMOTEROL FUMARATE DIHYDRATE 2 PUFF: 200; 5 AEROSOL RESPIRATORY (INHALATION) at 08:16

## 2019-04-02 RX ADMIN — ACETAMINOPHEN 650 MG: 325 TABLET, FILM COATED ORAL at 10:44

## 2019-04-02 RX ADMIN — THERA TABS 1 TABLET: TAB at 08:21

## 2019-04-02 RX ADMIN — METOPROLOL TARTRATE 12.5 MG: 25 TABLET ORAL at 21:40

## 2019-04-02 RX ADMIN — FLUTICASONE PROPIONATE 1 SPRAY: 50 SPRAY, METERED NASAL at 08:24

## 2019-04-02 RX ADMIN — METOPROLOL TARTRATE 12.5 MG: 25 TABLET ORAL at 08:21

## 2019-04-02 RX ADMIN — CLONAZEPAM 0.5 MG: 0.5 TABLET ORAL at 21:40

## 2019-04-02 RX ADMIN — MOMETASONE FUROATE AND FORMOTEROL FUMARATE DIHYDRATE 2 PUFF: 200; 5 AEROSOL RESPIRATORY (INHALATION) at 20:40

## 2019-04-02 RX ADMIN — FOLIC ACID 1 MG: 1 TABLET ORAL at 08:21

## 2019-04-02 RX ADMIN — PANTOPRAZOLE SODIUM 40 MG: 40 TABLET, DELAYED RELEASE ORAL at 06:17

## 2019-04-02 RX ADMIN — ACETAMINOPHEN 650 MG: 325 TABLET, FILM COATED ORAL at 20:14

## 2019-04-02 RX ADMIN — FLECAINIDE ACETATE 50 MG: 100 TABLET ORAL at 08:21

## 2019-04-02 RX ADMIN — HYDROCORTISONE 10 MG: 10 TABLET ORAL at 13:04

## 2019-04-02 RX ADMIN — FLECAINIDE ACETATE 50 MG: 100 TABLET ORAL at 21:41

## 2019-04-02 RX ADMIN — CLONAZEPAM 0.5 MG: 0.5 TABLET ORAL at 08:21

## 2019-04-02 RX ADMIN — ENOXAPARIN SODIUM 80 MG: 80 INJECTION SUBCUTANEOUS at 08:22

## 2019-04-02 ASSESSMENT — PAIN DESCRIPTION - LOCATION
LOCATION: LEG

## 2019-04-02 ASSESSMENT — PAIN SCALES - GENERAL
PAINLEVEL_OUTOF10: 4
PAINLEVEL_OUTOF10: 5
PAINLEVEL_OUTOF10: 0
PAINLEVEL_OUTOF10: 6
PAINLEVEL_OUTOF10: 3
PAINLEVEL_OUTOF10: 0

## 2019-04-02 ASSESSMENT — PAIN DESCRIPTION - PAIN TYPE
TYPE: ACUTE PAIN

## 2019-04-02 ASSESSMENT — PAIN DESCRIPTION - DESCRIPTORS
DESCRIPTORS: ACHING

## 2019-04-02 ASSESSMENT — PAIN DESCRIPTION - ORIENTATION
ORIENTATION: RIGHT

## 2019-04-02 ASSESSMENT — PAIN DESCRIPTION - FREQUENCY
FREQUENCY: INTERMITTENT
FREQUENCY: INTERMITTENT

## 2019-04-02 ASSESSMENT — PAIN DESCRIPTION - ONSET
ONSET: GRADUAL
ONSET: GRADUAL

## 2019-04-02 ASSESSMENT — PAIN DESCRIPTION - PROGRESSION
CLINICAL_PROGRESSION: GRADUALLY WORSENING
CLINICAL_PROGRESSION: GRADUALLY IMPROVING

## 2019-04-02 ASSESSMENT — PAIN - FUNCTIONAL ASSESSMENT
PAIN_FUNCTIONAL_ASSESSMENT: PREVENTS OR INTERFERES SOME ACTIVE ACTIVITIES AND ADLS
PAIN_FUNCTIONAL_ASSESSMENT: PREVENTS OR INTERFERES SOME ACTIVE ACTIVITIES AND ADLS

## 2019-04-02 NOTE — PROGRESS NOTES
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 Stewart Daughters  Diagnosis: Stroke; L M2 occlusion and thrombectomy     Restrictions/Precautions  Restrictions/Precautions: Fall Risk        Position Activity Restriction  Other position/activity restrictions: aphasia, exp and poss receptive, telemetry    Subjective: Patient met in dept, sister present    Objective:  Pt able to write name, address, but difficulty with phone number. Seems to do somewhat better if item is written instead of verbally provided. Does best with one word cues - unable to read sentence such as \"what did you hve for lunch? \"    Able to read pill bottle and fill pill keeper for once and twice a day pills without cues    Matched 10 cards to board with good tracking skills right and left    Matched 2 pc wooden puzzle for attn to detail - initially difficulty with only 4 pr, but after repetition, able to complete 6 pr correctly without cues    Assessment: pt requires SBA for mobiity without device, follows directions for activities well. Min decreased attn to detail when matching more detailed puzzle, good scanning skill when matching cards to board.   Need to continue to assess pt's ability to be alone - lives with daughter, but will not have 24 hr supervision    Safety Device - Type of devices:  []  All fall risk precautions in place [] Bed alarm in place  [] Call light within reach [] Chair alarm in place [] Positioning belt [x] Gait belt [] Patient at risk for falls [] Left in bed [] Left in chair [] Telesitter in use [] Sitter present [] Nurse notified []  None      Therapy Time   Individual Co-treatment   Time In 1515     Time Out 1600 Minutes 45       Electronically signed by Aleksandr Araya OT on 4/2/2019 at 4:39 PM

## 2019-04-02 NOTE — PROGRESS NOTES
Department of [facility] Progress Note    Patient Identification:  Gt Covington  [id number]  : 1960  Admit date: 3/31/2019      Diagnosis:   Patient Active Problem List   Diagnosis    Acute ischemic stroke ([address]           Subjective: Pt seen this AM.  She is talking better, and able to put together sentences better this morning. She is working with speech therapy on her a aphasia. PT and OT are working on her transfers, balance, and self-care activities. Repeat labs yesterday looked good and are stable. Patient will be discussed in rehabilitation conference tomorrow. /65   Pulse 65   Temp 98 °F (36.7 °C) (Oral)   Resp 16   Ht 5' 9\" (1.753 m)   Wt 187 lb 13.3 oz (85.2 kg)   SpO2 99%   BMI 27.74 kg/m²     Last 24 hour lab  No results found for this or any previous visit (from the past 24 hour(s)). Therapy progress:  PT  Position Activity Restriction  Other position/activity restrictions: aphasia, exp and poss receptive, telemetry  Objective     Sit to Stand: Stand by assistance  Stand to sit: Stand by assistance  Bed to Chair: Stand by assistance  Device: No Device  Assistance: Stand by assistance, Contact guard assistance  Distance: 220' x 2  OT  PT Equipment Recommendations  Equipment Needed: No             SLP:   Cognitive Diagnosis: Ongoing assessment. Objective assessment is difficult due to severity of receptive and expressive langauge aphasia  Aphasia Diagnosis: Receptive and expressive language aphasia impacting all modalities. Pt's language processing improves with max visual demonstrations. Pt's verbal expression characterized by functional greetings and accasional automatics; but paraphasias, neologisms, perseverations impact expression of needs/wants. pt does attempt to use gestures to compensate. Pt was able to express basic biographical information via written expression.  Pt however was unable to write to compensate for verbal expressive

## 2019-04-02 NOTE — CARE COORDINATION
LEIGH FROM DR AILYN RASMUSSEN'S OFFICE RETURNED CALL TO STATE SHE HAS SPOKEN TO DR RASMUSSEN WHO REPORTS HE WANTS HER TO FOLLOW UP AFTER HER DISCHARGE. SHE REPORTS, PER dR RASMUSSEN, THE MEDICATION STAYS IN HER SYSTEM A LONG TIME AND SHE IS STILL GETTING THE EFFECTS OF THE MEDICATION. THEY WANT HER TO FOLLOW UP ONCE SHE IS DISCHARGED.   [facility]     Case Management   [phone]    4/2/2019  11:08 AM

## 2019-04-02 NOTE — PROGRESS NOTES
mediastinal invasion, inseparable from portions of descending thoracic aorta with possible lymphangitic spread of tumor consistent with known primary pulmonary malignancy. Direct correlation outside imaging would be helpful. Part solid nodule left lower lobe is concerning for primary pulmonary malignancy. Right upper lobe 2 mm nodule is indeterminate. Direct comparison with prior imaging would be helpful to assess for interval evolution. May represent pre-existing nodule or metastatic disease.      · US Thyroid-Neck-Head: 3/26/2019  IMPRESSION:  Dominant solid nodule in the right thyroid lobe measuring up to 3.7 cm.  Dedicated tissue sampling is recommended.     · RECENT RESULTS  MRI:3/24/2019  IMPRESSION:  Moderate sized acute left MCA distribution infarct as described. Susceptibility artifact in the left M2 segment consistent with thrombus.     ·  has a past medical history of Smoking history.     · Current Diet level:  Current Diet : Soft regular  Current Liquid Diet : Thin    Primary Complaint  Patient Complaint: pt denying problems chewing or swallowing this date/time. Pt was active with LSP at other facility and recommendation there was for Dysphagia III dental soft foods with thin liquid diet      Reason for Referral  Teresa Hill was referred for a bedside swallow evaluation to assess the efficiency of her swallow function, identify signs and symptoms of aspiration and make recommendations regarding safe dietary consistencies, effective compensatory strategies, and safe eating environment. Assessment Impression  1. Pt was seen in treatment office. Pt was oriented to self and able to participate in BSE    2. Dysphagia Diagnosis: Minimal to MIld Oropharyngeal Dysphagia characterized by prolonged mastication; lingual pumping to initiate A-P oral transit and delayed initiation of swallow. No immediate overt clinical s/s post swallow; no pt complaints post swallow; no voice quality changes post swallow. Pt did have sensation of any oral residue and was able to clear oral residue with spontaneous clearance swallow. · Will recommend diet upgrade to regular. Await MD orders if in agreement    Dysphagia Outcome Severity Scale: Level 6: Within functional limits/Modified independence     Treatment Plan  Requires SLP Intervention: Yes  Duration/Frequency of Treatment: Dysphagia 1-2 f/u session to ensure diet upgrade tolerance    Recommended Diet and Intervention  Diet Solids Recommendation: Regular  Liquid Consistency Recommendation: Thin  Recommended Form of Meds: (as desired by pt)  Therapeutic Interventions: Diet tolerance monitoring; right o-m ex    Compensatory Swallowing Strategies   Upright as possible for all oral intake;Small bites/sips;Swallow 2 times per bite/sip; Remain upright for 30-45 minutes after meals    Treatment/Goals  Dysphagia Goals: The patient will tolerate regular consistency food and thin liquid diet without observed clinical signs of aspiration; The patient/caregiver will demonstrate understanding of compensatory strategies for improved swallowing safety. General  Chart Reviewed: Yes  Respiratory Status: Room air  Follows Directions: Simple(with visual demonstrations and intermittent tactile cues)  Dentition: Some missing teeth  Prior Dysphagia History: pt denies history of problems prior to onset    Patient Positioning: Upright in chair    Consistencies Administered: Reg solid;Mechanical soft;Puree; Thin     Pain:  Pain Assessment  Patient Currently in Pain: Denies  Pain Assessment: 0-10  Pain Level: 5  Pain Type: Acute pain  Pain Location: Leg  Pain Orientation: Right  RN made aware of pt complaints and RN gave pt Tylenol    Vision/Hearing  Vision  Vision: Impaired  Vision Exceptions: Wears glasses at all times  Hearing  Hearing: Within functional limits    Oral Motor Deficits  Oral/Motor  Oral Motor: Exceptions to [facility]  Labial ROM: Reduced right  Labial Symmetry: Abnormal symmetry right  Labial Strength: Reduced  Lingual Coordination: Reduced  Velum: (fairly symmetrical during phonation of /a/)  Gag: (diminished)  Vocal Quality: (unremarkable)  Volitional Cough: Weak(dry volitional cough)  Volitional Swallow: Delayed(lingual pumping prior to volitional swallow achieved)    Oral Phase Dysfunction  Oral Phase  Oral Phase: Exceptions  Oral Phase Dysfunction  Impaired Mastication: (Prolonged but functional mastication)  Decreased Anterior to Posterior Transit: (episodic lingual pumping prior to initiation of A-P oral transit across consistencies)  Suspected Premature Bolus Loss: (potential)  Lingual/Palatal Residue: Reg solid(pt spontaneously clears oral residue)     Indicators of Pharyngeal Phase Dysfunction   Pharyngeal Phase  Pharyngeal Phase: Exceptions  Indicators of Pharyngeal Phase Dysfunction  Delayed Swallow: All     Pharyngeal Phase Comment: No overt clinical s/s of penetration or aspiration post swallow; no pt complaints post swallow; no voice quality changes post swallow    Prognosis  Prognosis  Prognosis for safe diet advancement: excellent  Individuals consulted  Consulted and agree with results and recommendations: Patient    Education  Patient Education Response: Verbalizes understanding;Needs reinforcement    Therapy Time  SLP Individual Minutes  Time In: 1030  Time Out: [place]ia[place]  Minutes: 25    Renu Gould,MS,CCC,SLP [facility]  Speech and Language Pathologist  4/2/2019 10:53 AM

## 2019-04-02 NOTE — CARE COORDINATION
ROBBIEW called dgtr, Finesse Sanders, to review pt's follow up appts. She is not sure she is comfortable with the OC/Echo to be completed here at [facility].  She will speak with Aunt and get back to this worker.   Mary Ellen Rouse [place]     Case Management   [phone]    4/2/2019  2:34 PM

## 2019-04-02 NOTE — PLAN OF CARE
Problem: Falls - Risk of:  Goal: Will remain free from falls  Description  Will remain free from falls. Pt has been free of falls this shift. Fall precautions in place: 2x bed rails up, non slip footwear, call light and personal possessions within reach. Verbalizes understanding of how and when to use call light. 4/1/2019 2048 by Panfilo Cintron RN  Outcome: Ongoing  4/1/2019 0832 by Ritika Ospina RN  Outcome: Ongoing     Problem: ACTIVITY INTOLERANCE/IMPAIRED MOBILITY  Goal: Mobility/activity is maintained at optimum level for patient  Outcome: Ongoing     Problem: SKIN INTEGRITY  Goal: LTG - Patient will be free from infection  Skin assessed, and pressure relief techniques used as needed while in chair and in bed. Position changes encouraged at least every two hours while in bed. Outcome: Ongoing  Goal: LTG - Patient will demonstrate appropriate pressure relief techniques  Outcome: Ongoing     Problem: Pain:  Goal: Pain level will decrease  Description  Pain level will decrease. Pain and discomfort being managed with nonpharmacologic and PRN analgesics per MD orders. Patient able to express presence, quality, and severity of pain using numeric pain scale and gesticulates with number of fingers. .   4/1/2019 2048 by Panfilo Cintron RN  Outcome: Ongoing  4/1/2019 0832 by Ritika Ospina RN  Outcome: Ongoing

## 2019-04-02 NOTE — PROGRESS NOTES
Occupational Therapy  Facility/Department: [address] IP REHAB  Daily Treatment Note  NAME: Elisa Kilpatrick  : 1960  MRN: [id number]    Date of Service: 2019    Discharge Recommendations:  Home with assist PRN, 24 hour supervision or assist, Continue to assess pending progress       Assessment   Performance deficits / Impairments: Decreased functional mobility ; Decreased safe awareness;Decreased high-level IADLs;Decreased ADL status; Decreased balance  Assessment: Discussed with OTR: Pt tolerated session well, completing cooking task, with SBA for balance and managing stove top burner, prep and clean up w/o difficulty. Pt completed money counting with cues, assist for more larger amount of coins to count (even when adding on paper). Pt SBA for fx'l transfers, mob. Cont with POC to maximize function. Treatment Diagnosis: Decreased adl, communication, functional mobility  Prognosis: Good  Patient Education: role of OT  REQUIRES OT FOLLOW UP: Yes  Activity Tolerance  Activity Tolerance: Patient Tolerated treatment well  Safety Devices  Safety Devices in place: Yes  Type of devices: Gait belt         Patient Diagnosis(es): There were no encounter diagnoses. has a past medical history of Smoking history. has no past surgical history on file. Restrictions  Restrictions/Precautions  Restrictions/Precautions: Fall Risk  Position Activity Restriction  Other position/activity restrictions: aphasia, exp and poss receptive, telemetry  Subjective   General  Chart Reviewed: Yes  Additional Pertinent Hx: Lung CA with bone mets (poss at C4), COPD, A fib, PE  Family / Caregiver Present: No  Referring Practitioner: Dr. Suzanne Jessica  Diagnosis: Stroke; L M2 occlusion and thrombectomy  Subjective  Subjective: Pt seen in dept.  Pt points to R leg, stating \"just a little\" for pain question  General Comment  Comments: Goes by Beena Horton  Orientation  Overall Orientation Status: (unable to assess, d/t be Independent with light meal prep  Short term goal 6: Pt will undergo further cognition testing/home safety evaluation/tx to meet above goals  Long term goals  Time Frame for Long term goals : STG = LTG  Patient Goals   Patient goals : \"to get better\"       Therapy Time   Individual Concurrent Group Co-treatment   Time In 0915         Time Out 1000         Minutes 45               Ramon REINOSO/RICARDO,080

## 2019-04-02 NOTE — PROGRESS NOTES
Patient is a 61 y/o female admitted to rehab with stroke. A/A/O x 3 with expressive aphasia. Transfers with assist of 1 no device. On dysphagia 3 diet, tolerating *well. Medications taken whole in water. On Lovenox for DVT prophylaxis. Skin intact. On room air. Has been continent of urine and stool. L Will monitor for safety.

## 2019-04-02 NOTE — DISCHARGE INSTR - COC
Continuity of Care Form    Patient Name: Teresa Hill   :  1960  MRN:  [id number]    Admit date:  3/31/2019  Discharge date:  2019    Code Status Order: Full Code   Advance Directives:   [facility] Documentation     Date/Time Healthcare Directive Type of Healthcare Directive Copy in [address] [place] [address] [address] Agent's Name Healthcare Agent's Phone Number    19 1633  No, patient does not have an advance directive for healthcare treatment -- -- -- -- --    19 1618  -- patient not sure -- -- -- -- --          Admitting Physician:  Sandra Reynoso MD  PCP: No primary care provider on file. Discharging Nurse: [facility] Unit/Room#: Y0Z-2249/3257-01  Discharging Unit Phone Number: [phone]    Emergency Contact:   Extended Emergency Contact Information  Primary Emergency Contact: Raleigh Bird  Mobile Phone: [phone]  Relation: Child  Preferred language: English   needed? No  Secondary Emergency Contact: Awilda [place]  Mobile Phone: [phone]  Relation: Brother/Sister  Preferred language: English   needed? No    Past Surgical History:  No past surgical history on file. Immunization History: There is no immunization history on file for this patient.     Active Problems:  Patient Active Problem List   Diagnosis Code    Acute ischemic stroke (HCC) I63.9       Isolation/Infection:   Isolation          No Isolation            Nurse Assessment:  Last Vital Signs: BP 99/67   Pulse 65   Temp 97.7 °F (36.5 °C) (Oral)   Resp 16   Ht 5' 9\" (1.753 m)   Wt 187 lb 13.3 oz (85.2 kg)   SpO2 100%   BMI 27.74 kg/m²     Last documented pain score (0-10 scale): Pain Level: 3  Last Weight:   Wt Readings from Last 1 Encounters:   19 187 lb 13.3 oz (85.2 kg)     Mental Status:  oriented, alert, coherent, logical and thought processes intact    IV Access:  - port to right chest    Nursing Mobility/ADLs:  Walking Independent  Transfer  Independent  Bathing  Independent  Dressing  Independent  [address] Delivery   whole    Wound Care Documentation and Therapy:        Elimination:  Continence:   · Bowel: Yes  · Bladder: Yes  Urinary Catheter: None   Colostomy/Ileostomy/Ileal Conduit: No       Date of Last BM: 4/4/2019    Intake/Output Summary (Last 24 hours) at 4/2/2019 1723  Last data filed at 4/2/2019 1343  Gross per 24 hour   Intake 240 ml   Output --   Net 240 ml     I/O last 3 completed shifts: In: 240 [P.O.:240]  Out: -     Safety Concerns: At Risk for Falls    Impairments/Disabilities:      None    Nutrition Therapy:  Current Nutrition Therapy:   - Oral Diet:  Dysphagia 3 advanced    Routes of Feeding: Oral  Liquids: No Restrictions  Daily Fluid Restriction: no  Last Modified Barium Swallow with Video (Video Swallowing Test): not done    Treatments at the Time of Hospital Discharge:   Respiratory Treatments: None  Oxygen Therapy:  is not on home oxygen therapy.   Ventilator:    - No ventilator support    Rehab Therapies: Nurse, Occupational Therapy and Speech/Language Therapy  Weight Bearing Status/Restrictions: No weight bearing restirctions  Other Medical Equipment (for information only, NOT a DME order):  walker  Other Treatments: None    Patient's personal belongings (please select all that are sent with patient):  Nishant    RN SIGNATURE:  Electronically signed by Silvana Woodard RN on 4/6/19 at 10:25 AM    CASE MANAGEMENT/SOCIAL WORK SECTION    Inpatient Status Date: 3-    Readmission Risk Assessment Score:10    Discharging to Facility/ Agency   · Name:  [facility]    · Address: [address] [place] [address], [address] [place] [address]  · Phone: [phone]  · Fax: [phone]    / signature: Angel Milian     Case Management   [phone]    4/5/2019  12:07 PM      PHYSICIAN SECTION    Prognosis: Good    Condition at Discharge: Stable    Rehab Potential (if transferring to Rehab): Good    Recommended Labs or Other Treatments After Discharge: Nurse, [address] [place] [address]    Physician Certification: I certify the above information and transfer of Susie Carlton  is necessary for the continuing treatment of the diagnosis listed and that she requires Home Care for less 30 days.      Update Admission H&P: No change in H&P    PHYSICIAN SIGNATURE:  Electronically signed by Ciara Meléndez MD on 4/4/19 at 2:48 PM

## 2019-04-02 NOTE — PLAN OF CARE
Problem: Falls - Risk of:  Goal: Will remain free from falls  Description  Will remain free from falls  4/2/2019 1019 by Lizzy Kovacs RN  Outcome: Ongoing  Note:   Pt is at risk for falls. Call light in reach. Bed in low position. Alarm on. Nonskid footwear on. Possessions in reach. Pt transfers with no device only gait belt. 4/2/2019 0512 by Josephine Hernández RN  Outcome: Ongoing  4/1/2019 2048 by Ministerio Cruz RN  Outcome: Ongoing  Goal: Absence of physical injury  Description  Absence of physical injury  4/2/2019 0512 by Josephine Hernández RN  Outcome: Ongoing     Problem: ACTIVITY INTOLERANCE/IMPAIRED MOBILITY  Goal: Mobility/activity is maintained at optimum level for patient  4/2/2019 1019 by Lizzy Kovacs RN  Outcome: Ongoing  Note:   Pt is participating in therapies. 4/1/2019 2048 by Ministerio Cruz RN  Outcome: Ongoing     Problem: COMMUNICATION IMPAIRMENT  Goal: Ability to express needs and understand communication  Outcome: Ongoing  Note:   Pt with expressive aphasia. Problem: SKIN INTEGRITY  Goal: LTG - patient will maintain/improve skin integrity through proper skin care techniques  Outcome: Ongoing  Note:   Pt is at risk for impaired skin integrity. Assess skin every shift and prn. Turn every 2 hours. Keep heels off bed. Keep skin clean and dry.    Goal: LTG - Patient will demonstrate appropriate pressure relief techniques  4/1/2019 2048 by Ministerio Cruz RN  Outcome: Ongoing     Problem: Pain:  Goal: Pain level will decrease  Description  Pain level will decrease  4/2/2019 0512 by Josephine Hernández RN  Outcome: Ongoing  4/1/2019 2048 by Ministerio Cruz RN  Outcome: Ongoing  Goal: Control of acute pain  Description  Control of acute pain  4/2/2019 0512 by Josephine Hernández RN  Outcome: Ongoing

## 2019-04-02 NOTE — CARE COORDINATION
LSW reviewed pt's follow up appts with Dr Norbert Joyce. [facility] and asked if this could be pushed back. LSW was told it was just a follow up visit. LSW rescheduled to:  Friday 4- at 9:10 am.   Desean Lee is:  [address]  4th floor, [address], [address]  Call placed to [facility]  who confirmed this appt is scheduled for 4-8-2019. Candice [place]     Case Management   [phone]    4/2/2019  10:07 AM   Call placed to Dr Bria Graves office  [phone] to inform them she is in Acute Rehab and to ask about her Keytruda infusions. The message will be passed to Laury Gill in their office who will call back.   Angel Busch     Case Management   [phone]    4/2/2019  10:16 AM

## 2019-04-02 NOTE — PLAN OF CARE
Problem: Falls - Risk of:  Goal: Will remain free from falls  Description  Will remain free from falls  Problem: Falls - Risk of:  Goal: Absence of physical injury  Description  Absence of physical injury  Outcome: Ongoing     Problem: SKIN INTEGRITY  Goal: LTG - Patient will demonstrate appropriate pressure relief techniques  Problem: Pain:    Goal: Pain level will decrease  Description  Pain level will decrease  Outcome: Ongoing

## 2019-04-02 NOTE — PROGRESS NOTES
Speech Language Pathology  ACUTE REHAB UNIT  SPEECH/LANGUAGE PATHOLOGY      [x] Daily  [x] Weekly Care Conference Note  [] Discharge    [facility]       :1960  DSX:[id number]  Rehab Dx/Hx: Acute ischemic stroke ([facility] [address] [I63.9]    Precautions: Aphasia-Communication; Medical  Home situation: Lives with dtr   Dx: [x] Aphasia  [] Dysarthria  [] Apraxia   [x] Oropharyngeal dysphagia [x] Cognitive   Impairment  [] Other:     Initial Speech Therapy Assessment Diagnosis  1. Cognitive Diagnosis: Ongoing assessment. Objective assessment is difficult due to severity of receptive and expressive langauge aphasia  2. Aphasia Diagnosis: Receptive and expressive language aphasia impacting all modalities. Pt's language processing improves with max visual demonstrations. Pt's verbal expression characterized by functional greetings and accasional automatics; but paraphasias, neologisms, perseverations impact expression of needs/wants. pt does attempt to use gestures to compensate. Pt was able to express basic biographical information via written expression. Pt however was unable to write to compensate for verbal expressive language problems. Pt does demonstrate reading processing at word/phrase level. Will intiate therapy  3. Speech Diagnosis: Right facial asym noted during Oral Motor Exam. Pt 's speech is intelligible; but dysphasia errors impact conveying needs/wants. No significant phonemic distortions  4. Dysphagia: Oropharyngeal Dysphagia characterized by prolonged, but functional mastication; variable disorganized A-P oral transit; and delayed initiation of swallow. Despite these problems; no overt clinical s/s of penetration ; no voice quality changes post swallow;no pt complaints post swallow.  Plan to request diet upgrade to regular consistency with thin liquids from dysphagia III    Date of Admit: 3/31/2019  Room #: G5D-6393/3257-01  Date: 2019       Current functional status (updated daily): notified  [] Repositioned  [] Intervention offered and patient declined  [] N/A  [] Other: [] RN notified  [] Repositioned  [] Intervention offered and patient declined  [] N/A  [] Other: [] RN notified  [] Repositioned  [] Intervention offered and patient declined  [] N/A  [] Other:   Subjective     PT seen in treatment office  Pt was receptive to assessment PT seen in treatment office  Pt was receptive to therapy with good effort PT was seen in treatment office  Sibling was present for the session  Pt was receptive to therapy with good effort   Objective:  Goals        Dysphagia Goals:  1. The patient will tolerate recommended diet without observed clinical signs of aspiration,  New goal Not targeted Pt/sibling ed in recommendation for diet upgrade. Specific examples of upgrade provided   2. The patient/caregiver will demonstrate understanding of compensatory strategies for improved swallowing safety. New goal Not targeted Pt/sibling ed in recommendation for diet upgrade and compensatory strategies   Communiation Goals:   Pt was unable to convey goals but to get talking better.         Therapy working toward pt goal Therapy working toward pt goal   Goal 1: Pt will demonstrate improved language processing at word and concrete sentence level for Y/N responses with <moderate clarification   Not targeted Pictured object/field of 3 written word discrimination: 50%; 100%; 100%    Processing of printed  Biographical information for name; address: [facility]    High Frequency functional reading at phrase/short sentence level: 40%    2-3 unit sentence description for field of 3 action picture discrimination: 50%    Sentence level directions (2-3 unit): <25%     Auditory processing of 3-4 unit questions regarding pictured stimuli: 60%   Goal 2: Pt will demonstrate improved processing for one step commands (body scheme comands; psychomotor commands) with < max visual demonstrations Not targeted Visual cues warranted; conditioning to language task warranted 1 step commands body scheme: moderate to max visual cues warranted   Goal 3: Pt will verbally express basic biographical information wtih <max assist Not targeted No data taken Written expressive language:   -IND for full name; age; ; partial address; sibling's name; Dtr's name    Verbal Expression of biographical information:   -IND for full name; age; and . IND for 1/2 fmaily member names that were targeted   Goal 4: PT will demonstrate improved word retrieval for varied naming tasks and word retrieval and thought organization for concrete concept explanation with < moderate       Not targeted Spontaneous:   -greetings  -Y/N verbal responses (at times not accurate)  -Occasional spontaneous/appropriate 2-3 word utterances  -25% CFN during reading tasks Pt/sibling ed in compensatory strategies:     -CFN (DL items): 53% with compensatory strategy    - CFN (money): 66%    -CFN via FC? response: perseveration increased . Activity paced; use of printed information; and intermittent use of VAT and/or sentence completion   Other areas targeted:   Cognitive:   -sorting money according to amounts: 100%  -counting money for amount identified: 75% IND  -oriented to date/place/stroke via use of printed [facility] format     Education:   Pt ed in BSE and recommendations Ongoing pt ed Ongoing pt/sibling ed   Safety Devices: [] Call light within reach  [] Chair alarm activated  [] Bed alarm activated  [] Other: [] Call light within reach  [] Chair alarm activated  [] Bed alarm activated  [x] Other: transport returned pt to room [] Call light within reach  [] Chair alarm activated  [] Bed alarm activated  [x] Other: transport returned pt to room   Progress Assessment: 2019: continue treatment goals. Pt responding well to therapy and structured speech tasks incorporating varied assistive strategies. Sibling here for one of the sessions for family ed    Plan: Continue as per plan of care.

## 2019-04-03 PROCEDURE — 92526 ORAL FUNCTION THERAPY: CPT

## 2019-04-03 PROCEDURE — 97127 HC SP THER IVNTJ W/FOCUS COG FUNCJ: CPT

## 2019-04-03 PROCEDURE — 97110 THERAPEUTIC EXERCISES: CPT | Performed by: PHYSICAL THERAPIST

## 2019-04-03 PROCEDURE — 97530 THERAPEUTIC ACTIVITIES: CPT

## 2019-04-03 PROCEDURE — 92507 TX SP LANG VOICE COMM INDIV: CPT

## 2019-04-03 PROCEDURE — 94760 N-INVAS EAR/PLS OXIMETRY 1: CPT

## 2019-04-03 PROCEDURE — 97535 SELF CARE MNGMENT TRAINING: CPT

## 2019-04-03 PROCEDURE — 1280000000 HC REHAB R&B

## 2019-04-03 PROCEDURE — 94640 AIRWAY INHALATION TREATMENT: CPT

## 2019-04-03 PROCEDURE — 6370000000 HC RX 637 (ALT 250 FOR IP): Performed by: PHYSICAL MEDICINE & REHABILITATION

## 2019-04-03 PROCEDURE — 97530 THERAPEUTIC ACTIVITIES: CPT | Performed by: PHYSICAL THERAPIST

## 2019-04-03 PROCEDURE — 97116 GAIT TRAINING THERAPY: CPT | Performed by: PHYSICAL THERAPIST

## 2019-04-03 PROCEDURE — 6360000002 HC RX W HCPCS: Performed by: PHYSICAL MEDICINE & REHABILITATION

## 2019-04-03 RX ADMIN — ACETAMINOPHEN 650 MG: 325 TABLET, FILM COATED ORAL at 06:21

## 2019-04-03 RX ADMIN — ATORVASTATIN CALCIUM 40 MG: 40 TABLET, FILM COATED ORAL at 20:32

## 2019-04-03 RX ADMIN — FLECAINIDE ACETATE 50 MG: 100 TABLET ORAL at 20:33

## 2019-04-03 RX ADMIN — PANTOPRAZOLE SODIUM 40 MG: 40 TABLET, DELAYED RELEASE ORAL at 06:18

## 2019-04-03 RX ADMIN — CLONAZEPAM 0.5 MG: 0.5 TABLET ORAL at 07:25

## 2019-04-03 RX ADMIN — CLONAZEPAM 0.5 MG: 0.5 TABLET ORAL at 20:32

## 2019-04-03 RX ADMIN — THERA TABS 1 TABLET: TAB at 07:24

## 2019-04-03 RX ADMIN — ENOXAPARIN SODIUM 80 MG: 80 INJECTION SUBCUTANEOUS at 07:25

## 2019-04-03 RX ADMIN — FLUTICASONE PROPIONATE 1 SPRAY: 50 SPRAY, METERED NASAL at 07:26

## 2019-04-03 RX ADMIN — ACETAMINOPHEN 650 MG: 325 TABLET, FILM COATED ORAL at 20:34

## 2019-04-03 RX ADMIN — FLECAINIDE ACETATE 50 MG: 100 TABLET ORAL at 07:25

## 2019-04-03 RX ADMIN — ENOXAPARIN SODIUM 80 MG: 80 INJECTION SUBCUTANEOUS at 20:36

## 2019-04-03 RX ADMIN — HYDROCORTISONE 10 MG: 10 TABLET ORAL at 11:36

## 2019-04-03 RX ADMIN — MOMETASONE FUROATE AND FORMOTEROL FUMARATE DIHYDRATE 2 PUFF: 200; 5 AEROSOL RESPIRATORY (INHALATION) at 08:30

## 2019-04-03 RX ADMIN — FOLIC ACID 1 MG: 1 TABLET ORAL at 07:24

## 2019-04-03 RX ADMIN — METOPROLOL TARTRATE 12.5 MG: 25 TABLET ORAL at 07:25

## 2019-04-03 RX ADMIN — ACETAMINOPHEN 650 MG: 325 TABLET, FILM COATED ORAL at 11:36

## 2019-04-03 RX ADMIN — HYDROCORTISONE 20 MG: 10 TABLET ORAL at 07:24

## 2019-04-03 RX ADMIN — MOMETASONE FUROATE AND FORMOTEROL FUMARATE DIHYDRATE 2 PUFF: 200; 5 AEROSOL RESPIRATORY (INHALATION) at 20:09

## 2019-04-03 ASSESSMENT — PAIN SCALES - GENERAL
PAINLEVEL_OUTOF10: 5
PAINLEVEL_OUTOF10: 0
PAINLEVEL_OUTOF10: 0
PAINLEVEL_OUTOF10: 2
PAINLEVEL_OUTOF10: 1
PAINLEVEL_OUTOF10: 4
PAINLEVEL_OUTOF10: 0
PAINLEVEL_OUTOF10: 4
PAINLEVEL_OUTOF10: 3

## 2019-04-03 ASSESSMENT — PAIN - FUNCTIONAL ASSESSMENT
PAIN_FUNCTIONAL_ASSESSMENT: ACTIVITIES ARE NOT PREVENTED

## 2019-04-03 ASSESSMENT — PAIN DESCRIPTION - ONSET
ONSET: ON-GOING
ONSET: ON-GOING
ONSET: GRADUAL
ONSET: GRADUAL

## 2019-04-03 ASSESSMENT — PAIN DESCRIPTION - PAIN TYPE
TYPE: ACUTE PAIN

## 2019-04-03 ASSESSMENT — PAIN DESCRIPTION - DESCRIPTORS
DESCRIPTORS: ACHING

## 2019-04-03 ASSESSMENT — PAIN DESCRIPTION - PROGRESSION
CLINICAL_PROGRESSION: NOT CHANGED
CLINICAL_PROGRESSION: GRADUALLY IMPROVING
CLINICAL_PROGRESSION: GRADUALLY IMPROVING

## 2019-04-03 ASSESSMENT — PAIN DESCRIPTION - ORIENTATION
ORIENTATION: RIGHT

## 2019-04-03 ASSESSMENT — PAIN DESCRIPTION - FREQUENCY
FREQUENCY: INTERMITTENT

## 2019-04-03 ASSESSMENT — PAIN DESCRIPTION - LOCATION
LOCATION: LEG

## 2019-04-03 ASSESSMENT — PAIN DESCRIPTION - DIRECTION: RADIATING_TOWARDS: Q

## 2019-04-03 NOTE — PROGRESS NOTES
Pt A&O x4  Cooperative with care, able to make needs known. Pain /discomfort being managed with PRN analgesics per MD orders. Patient able to express presence and absence of pain and rate pain appropriately using numerical scale. Vitals signs are stable.   Intake and output are being recorded, will continue to monitor

## 2019-04-03 NOTE — PLAN OF CARE
Pt assessed for fall risk and fall precautions put into place. Bed in lowest position and wheels locked, call light within reach. Nonskid footwear in place. Patient educated on appropriate method of transfer and to call for assistance. Patient remains free from intentional harm, no signs or symptoms of intention to harm noted. Will continue to monitor patient throughout shift. Pt able to communicate and speak during physical activity. Will continue to monitor patient to ensure communication is possible throughout activity; will decrease activity level if goal is not being met.

## 2019-04-03 NOTE — PLAN OF CARE
Problem: Falls - Risk of:  Goal: Will remain free from falls  Description  Will remain free from falls  4/3/2019 0929 by Analia Castro RN  Outcome: Ongoing  Note:   Orange band on patient. Orange light on outside of room. Non skid footwear in place. Alarms used appropriately. Patient instructed to call and wait for staff before getting up. Rounding done to anticipate needs. Appropriate safety devices used for transfers. Problem: HEMODYNAMIC STATUS  Goal: Patient has stable vital signs and fluid balance  4/3/2019 0929 by Analia Castro RN  Outcome: Ongoing  Note:   Vital signs monitored per unit routine. Monitoring intake and output. Daily weight. Dietary restrictions noted and followed. Problem: ACTIVITY INTOLERANCE/IMPAIRED MOBILITY  Goal: Mobility/activity is maintained at optimum level for patient  4/3/2019 8967 by Analia Castro RN  Outcome: Ongoing  Note:   Patient working with therapy at least 3 hours/day to obtain maximal mobility. Safety devices used.

## 2019-04-03 NOTE — CARE COORDINATION
Team Conference held today. Team reviewed progress and goals. Team reports she is working in all three therapies. She continues with expressive and receptive aphagia. Team recommends a pill box for home use. No DME is recommended. Team recommends home SN/OT/SP therapies for DC. Team recommends DC on Saturday. LSW met with patient and sister at bedside to review. They are both pleased with dc to home on Saturday. They are receptive to home care orders; provided list of agencies for their review. LSW informed them of upcoming appts for Echo/OC, neurology. Sister is aware of them. LSW informed them of [facility] paperwork was received and Dr Tyra Leone if completing them. LSW informed them of  time for Saturday is between 10 - 12 noon. LSW informed them of recommended PCP visit with in 7 days. Discussion held regarding [facility]; sister reports she is only on the same medication she was already on.  Angel Louis     Case Management   [phone]    4/3/2019  4:54 PM   Call placed to [facility] to give this information as well. She approves of this plan.   Angel Louis     Case Management   [phone]    4/3/2019  4:59 PM

## 2019-04-03 NOTE — PROGRESS NOTES
expressive aphasia  Patient Education: educated on purpose of covering R chest port, instructed w/ need to further assess vision & safety prior to DC  REQUIRES OT FOLLOW UP: Yes  Activity Tolerance  Activity Tolerance: Patient Tolerated treatment well  Activity Tolerance: denied pain or dizziness during session, OT covered R chest port w/ tegaderm prior to shower, pt was able to assist w/ starting the tape off the roll  Safety Devices  Safety Devices in place: Yes  Type of devices: Call light within reach; Chair alarm in place; Left in chair         Patient Diagnosis(es): There were no encounter diagnoses. has a past medical history of Smoking history. has no past surgical history on file. Restrictions  Restrictions/Precautions  Restrictions/Precautions: Fall Risk  Position Activity Restriction  Other position/activity restrictions: aphasia, exp and poss receptive, telemetry  Subjective   General  Chart Reviewed:  Yes  Additional Pertinent Hx: Lung CA with bone mets (poss at C4), COPD, A fib, PE  Family / Caregiver Present: No  Referring Practitioner: Dr. Suzanne Jessica  Diagnosis: Stroke; L M2 occlusion and thrombectomy  Subjective  Subjective: met in room, pt sitting in recliner eating breakfast  General Comment  Comments: denies pain, has aphasia but able to follow commands, seems very accuate w/ yes & no responses; agreeable for shower      Orientation  Orientation  Overall Orientation Status: Impaired  Orientation Level: Oriented to person;Disoriented to place(oriented to month, reversed numerically order of yr, stated \"1920\"but shook her head no )  Objective    ADL  Feeding: Independent  Grooming: Independent  UE Bathing: Setup(OT covered R chest port site w/ tegaderm)  LE Bathing: Supervision;Verbal cueing(stood for entire shower (shower chair available if needed), pt used [facility] but required A to hang it on hook, gave min VCs to sit down to dry feet)  UE Dressing: Supervision(needed help to manage cardiac monitor during UB dressing tasks, otherwise pt retrieved her clothes & donned IND'ly, VCs to doff)  LE Dressing: Verbal cueing;Supervision(pt gathered her pants, VCs to get underwear, socks & shoes; alternated sitting & standing to don clothing over hips, no LOB)  Toileting: Modified independent (used GB intermittently; able to manage clothing down, up & wipes IND'ly)  Additional Comments: Stood in shower for bathing, dressed LB while standing, No LOB or dizziness        Balance  Sitting Balance: Modified independent   Standing Balance: Supervision  Standing Balance  Time: 8 minutes  Activity: during fxl mob, ADLs  Sit to stand: Supervision  Stand to sit: Supervision  Comment: pt can be a little impulsive but no LOB occurred, not using AD during t/f & fxl mob; gathered clothing from closet & able to bend down to get item off floor  Functional Mobility  Functional - Mobility Device: No device  Activity: Retrieve items;Transport items; To/from bathroom  Assist Level: Supervision  Functional Mobility Comments: slight unsteadiness but no LOB, does not use AD for fxl mob in room, able to gather clothes from closet and transport to bathrm  Toilet Transfers  Toilet - Technique: Ambulating  Equipment Used: Grab bars  Toilet Transfer: Modified independent  Toilet Transfers Comments: used GB w/ R hand  Shower Transfers  Shower - Transfer From: Other  Shower - Transfer Type: To and From  Shower - Transfer To: Standing  Shower - Technique: Ambulating  Shower Transfers: Supervision  Shower Transfers Comments: completed t/f using GB, able to stand for duration of shower, shower chair behind her if needed  Wheelchair Bed Transfers  Wheelchair/Bed - Technique: Ambulating  Equipment Used:  Other  Level of Asssistance: Supervision  Wheelchair Transfers Comments: transfer in & out of recliner w/ Supervision, no AD used however slight unsteadiness & impulsiveness noted     Transfers  Sit to stand: Supervision  Stand to sit: Supervision  Transfer Comments: ambulates & completes t/f without AD                    Vision--completed during PM session  Vision Comment: pt denies any new problems, wears glasses AATs--pt has 95* peripheral vision B'ly; able to read eye chart to 20/30 due to aphasia, for \"L\" she would say \"Tin\", had difficulty saying \"D\" \"Z\" and \"C\" but could draw each letter using finger in the air  Cognition  Overall Cognitive Status: WNL  Cognition Comment: has expressive aphasia but yes/no responses seem accurate; did not require any VCs throughout ADLs; PM session: mild forgetfulness to navigate from therapy gym <>ortho gym. Pt completed ACLS--she was able to complete running & whip stitches w/o demo; was given 2 demo's for single cordovan--she made twisting & directional errors, her score of 4.4 indicates the need to live w/ someone who does a daily check on the environment & removes any hazards and solves new problems. Person may be left alone for part of the day & manage a daily allowance.  Pt has aphasia which will impact her ability to communicate her needs to others--she may not recognize errors unless clearly visible & may request help when mistakes are noticed, attention span is good up to 1 hr (completed during PM session)     Perception--completed during PM session  Overall Perceptual Status: WFL(has good proprioception/body awareness)                       Car Transfers--completed during PM session  Car - Transfer From: Other  Car - Technique: Ambulating  Car Transfers: Modified independence  Car Transfers: slow to process how to open car door           Plan   Plan  Times per week: 5-6 x week  Times per day: Twice a day  Plan weeks: DC FRI after PM therapies   Specific instructions for Next Treatment: car t/f, vision testing  Current Treatment Recommendations: Neuromuscular Re-education, Patient/Caregiver Education & Training, Equipment Evaluation, Education, & procurement, Self-Care / ADL, Safety Education & Training, Home Management Training, Cognitive/Perceptual Training, Functional Mobility Training, Cognitive Reorientation, Balance Training    Goals  Short term goals  Time Frame for Short term goals: 1 week  Short term goal 1: Pt will be Independent with functional transfers  Short term goal 2: Pt will be Independent with functional mobility  Short term goal 3: Pt will be Independent with toileting--goal met 4/3/19  Short term goal 4: Pt will be Independent with bathing and dressing  Short term goal 5: Pt will be Independent with light meal prep  Short term goal 6: Pt will undergo further cognition testing/home safety evaluation/tx to meet above goals  Long term goals  Time Frame for Long term goals : STG = LTG  Patient Goals   Patient goals : \"to get better\"       Therapy Time   Individual Concurrent Group PM-treatment   Time In 0855      1345   Time Out 4048      6462   Minutes 60      45   Timed Code Treatment Minutes: [address], OTR/L [address][id number]

## 2019-04-03 NOTE — PROGRESS NOTES
Patient had short run of A-fib on monitor with rate in the 140's, asymptomatic and quickly converted back to NSR. Rate now in the 60's, she is eating dinner and family is visiting. Will monitor.

## 2019-04-03 NOTE — PROGRESS NOTES
impact expression of needs/wants. pt does attempt to use gestures to compensate. Pt was able to express basic biographical information via written expression. Pt however was unable to write to compensate for verbal expressive language problems. Pt does demonstrate reading processing at word/phrase level. Will intiate therapy  Speech Diagnosis: Right facial asym noted during Oral Motor Exam. Pt 's speech is intelligible; but dysphasia errors impact conveying needs/wants. No significant phonemic distortions  Dysphagia Diagnosis: (Minimal to MIld Oropharyngeal Dysphagia characterized by prlonged mastication; lingual pumping to initiate A-P oral transit and delayed initiation of swallow. No immediate overt clinical s/s post swallow; no pt complaints post swallow; no voice qulatiy c)  Dysphagia Impression : (changes post swallow. Will rec ommend diet upgrade to regular. Await MD orders if in agreement)  Dysphagia Outcome Severity Scale: Level 6: Within functional limits/Modified independence   Assessment and Plan:       Left MCA infarction-right hemiparesis, and aphasia-Lovenox       Metastatic adenocarcinoma of lung-keytruda      COPD-Dulera ,      A. Fib-  flecainide, Lopressor      low cortisol and ACTH-continue home hydrocortisone.     HLD-Lipitor      GERD-Protonix       thyroid nodule-benign.     Bowels: Schedule Miralax + Senna S. Follow bowel movements. Enema or suppository if needed.      Bladder: Check PVR x 3.   [address] if PVR > 200ml or if any volume is > 500 ml.      Pain: Tylenol is ordered prn.      ELOS: 4/6      Reena Escobar MD, 4/3/2019, 7:30 AM

## 2019-04-03 NOTE — PROGRESS NOTES
Treatment: Patient with no complaints from previous session. Family / Caregiver Present: No  Referring Practitioner: Dr. Jomar Davis: Patient has difficulty with expressing self, reports no pain, unable to correctly state birthday but only off by 1 day.           Orientation  Orientation  Overall Orientation Status: (difficult to assess due to global aphasia - see SP - patient only one day for bday)  Cognition      Objective   Bed mobility  Bridging: Independent  Rolling to Left: Independent  Rolling to Right: Independent  Supine to Sit: Independent  Sit to Supine: Independent  Scooting: Independent  Comment: when give directions unable to follow unless demonstrate movement  Transfers  Sit to Stand: Modified independent  Stand to sit: Modified independent  Bed to Chair: Modified independent  Comment: automatic , no device,   Ambulation  Ambulation?: Yes  Ambulation 1  Surface: level tile  Device: No Device  Assistance: Supervision;Modified Independent  Quality of Gait: Patient ambulated with good hernandez, with minimally decreased clearance on R with occassional shuffle, limited weightshift over R LE with swing through of L LE, tends to ER R LE, generally steady with no loss of balance  Distance: 1250', short distances in room  Comments: 6 minue walk test = 1250', patient unable to find room required cues, patient safe no LOB  Stairs/Curb  Stairs?: Yes  Stairs  # Steps : 12  Stairs Height: 6\"  Rails: Left ascending  Curbs: 6\"  Device: No Device  Assistance: Supervision  Comment: reciprocal gait pattern on steps with B rails, curb step with light CGA and no loss of balance        Exercises  Hip Flexion: 30  Hip Abduction: 15 with blue theraband, adductor sets with Ball x 10  Knee Long Arc Quad: 15 B LE  Ankle Pumps: 20  Comments: Patient given a sitting HEP, tends to perseverate and unable correctly count, loses train of thought         Other Activities: Other (see comment)  Comment: Patient will benefit from to acute inpatient rehab to address higher level balance activities and strengthening to improve overall mobility to independent. Patient will continue to see speech therapy twice a day. Anticipate discharge to home with family on Saturday, 4/6. Treatment Diagnosis: decreased functional mobility  Specific instructions for Next Treatment: high level balance activities  Prognosis: Good  Patient Education: safety with functional mobility  REQUIRES PT FOLLOW UP: Yes  Activity Tolerance  Activity Tolerance: Patient Tolerated treatment well       Goals  Short term goals  Time Frame for Short term goals: 5-7 days  Short term goal 1: transfers independent including bed mobility- met 4/3  Short term goal 2: ambulate community distances without AD independently  Short term goal 3: performed 12 steps with rail with MI  Short term goal 4: curb step with MI  Long term goals  Time Frame for Long term goals : STG = LTG  Patient Goals   Patient goals : Patient's goal is difficult to verbalize unable to verbalize, but did say automatically that wants to get better    Plan    Plan  Times per week: 5-6x  Times per day: Daily  Plan weeks: < 1 week  Specific instructions for Next Treatment: high level balance activities  Current Treatment Recommendations: Strengthening, Balance Training, Functional Mobility Training, Transfer Training, Endurance Training, Gait Training, Stair training, Safety Education & Training, Home Exercise Program, Patient/Caregiver Education & Training, Equipment Evaluation, Education, & procurement  Plan Comment: D/C , Saturday, 4/6  Safety Devices  Type of devices:  All fall risk precautions in place, Gait belt  Restraints  Initially in place: No     Therapy Time   Individual Concurrent Group Co-treatment   Time In 1030         Time Out 1115         Minutes 45         Timed Code Treatment Minutes: 39 Minutes    Second Session Therapy Time     Individual Co-treatment   Time In 1300 Time Out [address] [place] [address] Dr Lake Watts, PT # [id number]

## 2019-04-03 NOTE — PROGRESS NOTES
Speech Language Pathology  ACUTE REHAB UNIT  SPEECH/LANGUAGE PATHOLOGY      [x] Daily  [] Weekly Care Conference Note  [] Discharge    [address] [facility]       :1960  [id number]  Rehab Dx/Hx: Acute ischemic stroke ([facility] [address] [I63.9]    Precautions: Aphasia-Communication; Medical  Home situation: Lives with dtr   Dx: [x] Aphasia  [] Dysarthria  [] Apraxia   [x] Oropharyngeal dysphagia [x] Cognitive   Impairment  [] Other:     Initial Speech Therapy Assessment Diagnosis  1. Cognitive Diagnosis: Ongoing assessment. Objective assessment is difficult due to severity of receptive and expressive langauge aphasia  2. Aphasia Diagnosis: Receptive and expressive language aphasia impacting all modalities. Pt's language processing improves with max visual demonstrations. Pt's verbal expression characterized by functional greetings and accasional automatics; but paraphasias, neologisms, perseverations impact expression of needs/wants. pt does attempt to use gestures to compensate. Pt was able to express basic biographical information via written expression. Pt however was unable to write to compensate for verbal expressive language problems. Pt does demonstrate reading processing at word/phrase level. Will intiate therapy  3. Speech Diagnosis: Right facial asym noted during Oral Motor Exam. Pt 's speech is intelligible; but dysphasia errors impact conveying needs/wants. No significant phonemic distortions  4. Dysphagia: Oropharyngeal Dysphagia characterized by prolonged, but functional mastication; variable disorganized A-P oral transit; and delayed initiation of swallow. Despite these problems; no overt clinical s/s of penetration ; no voice quality changes post swallow;no pt complaints post swallow.  Plan to request diet upgrade to regular consistency with thin liquids from dysphagia III    Date of Admit: 3/31/2019  Room #: C3F-7809/3257-01  Date: 4/3/2019       Current functional status (updated daily): Pt being seen for : [x] Speech/Language Treatment  [x] Dysphagia Treatment [x] Cognitive Treatment  [] Other:  Current Diet Order:DIET GENERAL;   Communication: []WFL  [x] Aphasia  [] Dysarthria  [] Apraxia  [] Pragmatic Impairment [] Non-verbal  [] Hearing Loss  [] Other:   Cognition: [] WFL  [] Mild  [] Moderate  [] Severe [] Unable to Assess  [] Other:  Memory: [] WFL  [] Mild  [] Moderate  [] Severe [] Unable to Assess  [] Other:  Behavior: [x] Alert  [x] Cooperative  []  Pleasant  [] Confused  [] Agitated  [] Uncooperative  [] Distractible [] Motivated  [] Self-Limiting [] Anxious  [] Other:  Endurance:  [x] Adequate for participation in SLP sessions  [] Reduced overall  [] Lethargic  [] Other:  Safety: [] No concerns at this time  [] Reduced insight into deficits  []  Reduced safety awareness [] Not following call light procedures  [] Unable to Assess  [x] Other: ongoing assessment 2/2 to Aphasia  Swallowing Precautions: Sit up for all meals and thereafter for 30 minutes, Dry swallow after each bite/sip and Check mouth for food residue after snacks and meals  Barriers toward progress: medical issues and caregiver support may be barriers  Discharge recommendations:  [] Home independently  [x] Home with assistance []  24 hour supervision  [] ECF [] Other:  Continued Tx Upon Discharge: ? [x] Yes [] No [] TBD based on progress while on ARU [] Vital Stim indicated [] Other:   Estimated discharge date: TBD         Date: 4/3/2019      Tx session 1 Tx session 2   Total Timed Code Min SLP Individual Minutes  Time In: 0730  Time Out: 0820  Minutes: 50  Coded treatment time  0 SLP Individual Minutes  Time In: 1430  Time Out: 1515  Minutes: 45  Coded treatment time  10   Group Treatment Minutes 0 0   Co-Treat Minutes 0 0   Variance/Reason:  n/a n/a   Pain denied denied   Pain Intervention [] RN notified  [] Repositioned  [] Intervention offered and patient declined  [] N/A  [] Other: [] RN notified  [] Repositioned  [] Intervention offered and patient declined  [] N/A  [] Other:   Subjective     PT seen bedside  Pt was awake in bed with RN at her side  Pt was receptive to getting up in chair  Pt was receptive to assessment PT seen in treatment office  Pt was receptive to therapy with good effort   Objective:  Goals       Dysphagia Goals:  1. The patient will tolerate regular consistency with thin liquid diet without observed clinical signs of aspiration,  Small sample size of thin liquids and varied food consistencies. -no pt complaints  -no overt clinical s/s of penetration or aspiration  -pt spontaneously clearing any oral residue  -no voice quality changes post swallow Not targeted   2. The patient/caregiver will demonstrate understanding of compensatory strategies for improved swallowing safety. Set up and review for upright for all meals    · Occasional utensil use confusion Not targeted   Communiation Goals:   Pt was unable to convey goals but to get talking better. Therapy working toward pt goal Therapy working toward pt goal   Goal 1: Pt will demonstrate improved language processing at word and concrete sentence level for Y/N responses with <moderate clarification   Visual Language Processing  -word level for field of 6 picture discrimination: 83%; 100%; 100%; 100%    Word level for body part discrimination: 70% average    Biographical information: IND for name; ; age; sibling's name and daughter's name.  Address was not targeted this session      Auditory Processing  -word level for picture discrimination: 55%  -body part discrimination: 50%  -Y/N question response regarding visual stimuli: 66%         Auditory processing (without visual cues)  -2-4 unit Biographical Y/N?: 60%  -2-4 unit ideational Y/N?: <50%    Visual Language Processing:  -matching money amounts: 50% to 100% in set of 6 task trials   Goal 2: Pt will demonstrate improved processing for one step commands (body scheme comands; psychomotor commands) with < max visual demonstrations 1 step commands body scheme: moderate to max visual cues warranted    Item location in room (ie menu; TV remote etc): moderate to max visual dues Auditory processing without visual cues  -1 step (2 unit ) commands: max visual models warranted  -body scheme: moderate to max visual models warranted    Visual Language Processing  -1 step (2 unit) commands: moderate to max visual models warranted   Goal 3: Pt will verbally express basic biographical information wtih <max assist Written expressive language:   -IND for full name; age; . Address was not targeted this session  -letter fill in for high frequency words: small sample size but >80%  -CFN for high frequency DL items: small sample size: 60%    Verbal Expression of :  -biographical information: Assist for 100% for full name and   -CFN: assisted: (sentence completion; written cues): 70%   VErbal expression:   -assisted for full name and    Goal 4: PT will demonstrate improved word retrieval for varied naming tasks and word retrieval and thought organization for concrete concept explanation with < moderate       Spontaneous:   -greetings: appropriate  -Y/N verbal responses (at times not accurate)  -Occasional spontaneous/appropriate 2-3 word utterances  -meaningful speech during VAT: >50% including increase in labels etc Verbal expression at multiple word utterance level regarding pictured stimuli:   -25% of ideas completely appropriate regarding pictured stimuli. Errors of jargon; phonemic paraphasias.  Pt with inconsistent awareness of jargon and phonemic errors      CFN continue with assist CFN (paced; sentence completion/VAT and at times with written cues)   Other areas targeted: Cognitive:   -pt was able to use menu (set up regarding section for meal and section for various categories): pt selected items.  -External assist for calling in due to expressive language difficulties Cognitive: -sorting money according to amounts: 100%  -counting money for amount identified: >80%  -matching money amounts: see above     Pt often oriented to situation (ie money; writing biographical information; ordering pictured steps to basic ADLs). However; dysphasia can impact insight/awareness of language/communication errors which can impact   Education:   Ongoing pt ed Ongoing pt ed   Safety Devices: [x] Call light within reach  [x] Chair alarm activated  [] Bed alarm activated  [] Other: [] Call light within reach  [] Chair alarm activated  [] Bed alarm activated  [x] Other: transport returned pt to room    Progress Assessment: 4/2/2019: continue treatment goals. Pt responding well to therapy and structured speech tasks incorporating varied assistive strategies. Sibling here for one of the sessions for family ed. Recommended diet upgrade to regular consistency  4/3/2019: continue daily progress   Plan: Continue as per plan of care. Additional information:     Interventions used during Rehab Stay:  [x] Speech/Language Treatment  [] Instruction in HEP [] Group [x] Dysphagia Treatment [x] Cognitive Treatment   [] Other:     Adverse Reactions to Treatment/Significant Change in Status:       Electronically Signed by    Valorie Stone. Bryanna,MS,CCC,SLP [id number]  Speech and Language Pathologist

## 2019-04-04 LAB
ANION GAP SERPL CALCULATED.3IONS-SCNC: 10 MMOL/L (ref 3–16)
BASOPHILS ABSOLUTE: 0.1 K/UL (ref 0–0.2)
BASOPHILS RELATIVE PERCENT: 1.2 %
BUN BLDV-MCNC: 13 MG/DL (ref 7–20)
CALCIUM SERPL-MCNC: 9.3 MG/DL (ref 8.3–10.6)
CHLORIDE BLD-SCNC: 103 MMOL/L (ref 99–110)
CO2: 28 MMOL/L (ref 21–32)
CREAT SERPL-MCNC: <0.5 MG/DL (ref 0.6–1.1)
EOSINOPHILS ABSOLUTE: 0.3 K/UL (ref 0–0.6)
EOSINOPHILS RELATIVE PERCENT: 5.3 %
GFR AFRICAN AMERICAN: >60
GFR NON-AFRICAN AMERICAN: >60
GLUCOSE BLD-MCNC: 94 MG/DL (ref 70–99)
HCT VFR BLD CALC: 33.3 % (ref 36–48)
HEMOGLOBIN: 11.6 G/DL (ref 12–16)
LYMPHOCYTES ABSOLUTE: 1.6 K/UL (ref 1–5.1)
LYMPHOCYTES RELATIVE PERCENT: 32.2 %
MCH RBC QN AUTO: 32 PG (ref 26–34)
MCHC RBC AUTO-ENTMCNC: 34.9 G/DL (ref 31–36)
MCV RBC AUTO: 91.6 FL (ref 80–100)
MONOCYTES ABSOLUTE: 0.5 K/UL (ref 0–1.3)
MONOCYTES RELATIVE PERCENT: 9.5 %
NEUTROPHILS ABSOLUTE: 2.6 K/UL (ref 1.7–7.7)
NEUTROPHILS RELATIVE PERCENT: 51.8 %
PDW BLD-RTO: 12.5 % (ref 12.4–15.4)
PLATELET # BLD: 312 K/UL (ref 135–450)
PMV BLD AUTO: 7.2 FL (ref 5–10.5)
POTASSIUM REFLEX MAGNESIUM: 3.8 MMOL/L (ref 3.5–5.1)
RBC # BLD: 3.63 M/UL (ref 4–5.2)
SODIUM BLD-SCNC: 141 MMOL/L (ref 136–145)
WBC # BLD: 5 K/UL (ref 4–11)

## 2019-04-04 PROCEDURE — 97112 NEUROMUSCULAR REEDUCATION: CPT

## 2019-04-04 PROCEDURE — 80048 BASIC METABOLIC PNL TOTAL CA: CPT

## 2019-04-04 PROCEDURE — 97127 HC SP THER IVNTJ W/FOCUS COG FUNCJ: CPT

## 2019-04-04 PROCEDURE — 97110 THERAPEUTIC EXERCISES: CPT

## 2019-04-04 PROCEDURE — 1280000000 HC REHAB R&B

## 2019-04-04 PROCEDURE — 94640 AIRWAY INHALATION TREATMENT: CPT

## 2019-04-04 PROCEDURE — 94760 N-INVAS EAR/PLS OXIMETRY 1: CPT

## 2019-04-04 PROCEDURE — 97116 GAIT TRAINING THERAPY: CPT | Performed by: PHYSICAL THERAPIST

## 2019-04-04 PROCEDURE — 6360000002 HC RX W HCPCS: Performed by: PHYSICAL MEDICINE & REHABILITATION

## 2019-04-04 PROCEDURE — 92507 TX SP LANG VOICE COMM INDIV: CPT

## 2019-04-04 PROCEDURE — 97116 GAIT TRAINING THERAPY: CPT

## 2019-04-04 PROCEDURE — 6370000000 HC RX 637 (ALT 250 FOR IP): Performed by: PHYSICAL MEDICINE & REHABILITATION

## 2019-04-04 PROCEDURE — 85025 COMPLETE CBC W/AUTO DIFF WBC: CPT

## 2019-04-04 PROCEDURE — 97535 SELF CARE MNGMENT TRAINING: CPT

## 2019-04-04 PROCEDURE — 97530 THERAPEUTIC ACTIVITIES: CPT | Performed by: PHYSICAL THERAPIST

## 2019-04-04 PROCEDURE — 97530 THERAPEUTIC ACTIVITIES: CPT

## 2019-04-04 PROCEDURE — 36591 DRAW BLOOD OFF VENOUS DEVICE: CPT

## 2019-04-04 RX ADMIN — ACETAMINOPHEN 650 MG: 325 TABLET, FILM COATED ORAL at 04:41

## 2019-04-04 RX ADMIN — FLECAINIDE ACETATE 50 MG: 100 TABLET ORAL at 08:00

## 2019-04-04 RX ADMIN — MOMETASONE FUROATE AND FORMOTEROL FUMARATE DIHYDRATE 2 PUFF: 200; 5 AEROSOL RESPIRATORY (INHALATION) at 07:56

## 2019-04-04 RX ADMIN — ENOXAPARIN SODIUM 80 MG: 80 INJECTION SUBCUTANEOUS at 08:00

## 2019-04-04 RX ADMIN — CLONAZEPAM 0.5 MG: 0.5 TABLET ORAL at 19:55

## 2019-04-04 RX ADMIN — ACETAMINOPHEN 650 MG: 325 TABLET, FILM COATED ORAL at 12:22

## 2019-04-04 RX ADMIN — FLECAINIDE ACETATE 50 MG: 100 TABLET ORAL at 19:55

## 2019-04-04 RX ADMIN — ENOXAPARIN SODIUM 80 MG: 80 INJECTION SUBCUTANEOUS at 19:55

## 2019-04-04 RX ADMIN — MOMETASONE FUROATE AND FORMOTEROL FUMARATE DIHYDRATE 2 PUFF: 200; 5 AEROSOL RESPIRATORY (INHALATION) at 20:28

## 2019-04-04 RX ADMIN — ACETAMINOPHEN 650 MG: 325 TABLET, FILM COATED ORAL at 16:35

## 2019-04-04 RX ADMIN — HYDROCORTISONE 20 MG: 10 TABLET ORAL at 08:00

## 2019-04-04 RX ADMIN — ACETAMINOPHEN 650 MG: 325 TABLET, FILM COATED ORAL at 08:06

## 2019-04-04 RX ADMIN — HYDROCORTISONE 10 MG: 10 TABLET ORAL at 12:19

## 2019-04-04 RX ADMIN — PANTOPRAZOLE SODIUM 40 MG: 40 TABLET, DELAYED RELEASE ORAL at 06:07

## 2019-04-04 RX ADMIN — METOPROLOL TARTRATE 12.5 MG: 25 TABLET ORAL at 19:55

## 2019-04-04 RX ADMIN — METOPROLOL TARTRATE 12.5 MG: 25 TABLET ORAL at 08:00

## 2019-04-04 RX ADMIN — ATORVASTATIN CALCIUM 40 MG: 40 TABLET, FILM COATED ORAL at 19:55

## 2019-04-04 RX ADMIN — THERA TABS 1 TABLET: TAB at 08:00

## 2019-04-04 RX ADMIN — CLONAZEPAM 0.5 MG: 0.5 TABLET ORAL at 08:00

## 2019-04-04 RX ADMIN — FOLIC ACID 1 MG: 1 TABLET ORAL at 08:00

## 2019-04-04 ASSESSMENT — PAIN DESCRIPTION - FREQUENCY
FREQUENCY: INTERMITTENT

## 2019-04-04 ASSESSMENT — PAIN DESCRIPTION - DESCRIPTORS
DESCRIPTORS: ACHING
DESCRIPTORS: ACHING;CONSTANT
DESCRIPTORS: ACHING;CONSTANT
DESCRIPTORS: ACHING
DESCRIPTORS: ACHING;DISCOMFORT

## 2019-04-04 ASSESSMENT — PAIN - FUNCTIONAL ASSESSMENT
PAIN_FUNCTIONAL_ASSESSMENT: ACTIVITIES ARE NOT PREVENTED
PAIN_FUNCTIONAL_ASSESSMENT: ACTIVITIES ARE NOT PREVENTED

## 2019-04-04 ASSESSMENT — PAIN SCALES - GENERAL
PAINLEVEL_OUTOF10: 5
PAINLEVEL_OUTOF10: 4
PAINLEVEL_OUTOF10: 2
PAINLEVEL_OUTOF10: 3
PAINLEVEL_OUTOF10: 5
PAINLEVEL_OUTOF10: 2
PAINLEVEL_OUTOF10: 5
PAINLEVEL_OUTOF10: 5

## 2019-04-04 ASSESSMENT — PAIN DESCRIPTION - PAIN TYPE
TYPE: ACUTE PAIN

## 2019-04-04 ASSESSMENT — PAIN DESCRIPTION - LOCATION
LOCATION: LEG

## 2019-04-04 ASSESSMENT — PAIN DESCRIPTION - ONSET
ONSET: ON-GOING
ONSET: ON-GOING
ONSET: GRADUAL
ONSET: GRADUAL

## 2019-04-04 ASSESSMENT — PAIN DESCRIPTION - ORIENTATION
ORIENTATION: RIGHT

## 2019-04-04 ASSESSMENT — PAIN DESCRIPTION - PROGRESSION
CLINICAL_PROGRESSION: NOT CHANGED

## 2019-04-04 NOTE — PROGRESS NOTES
PHYSICAL THERAPY  Progress Note   Second Session    Patient Name: Teresa Hill  Medical Record Number: [id number]    Treatment Diagnosis: decreased functional mobility      Chart Reviewed: Yes   Restrictions/Precautions: Fall Risk Other position/activity restrictions: aphasia, exp and poss receptive, telemetry   Additional Pertinent Hx: Per Dr. Neri Eastman,  68-year-old female who is a known medical history of atrial fibrillation, metastatic adenocarcinoma of the left lung, on chemotherapy who developed the onset of right-sided weakness. Diagnosis of a left middle cerebral artery stroke was made, but no TPA was given since she was on Eliquis. CTA of the brain revealed a left MCA occlusion. Patient was taken to interventional radiology for thrombectomy. MRI scan revealed a left MCA distribution infarct. Patient was started on systemic Lovenox 7 days post stroke. Her aspirin was discontinued. Patient was found to have a mass in her heart on echocardiogram in September, 2018, and this condition disappeared on repeat echo in December, 2018 after she was treated with chemotherapy and anticoagulation. Patient scheduled for a repeat OC on April 4. Bilateral lower extremity Doppler revealed no DVT. Repeat CT scan of the chest revealed her known lung cancer, and oncology recommended Lovenox for anticoagulation treatment at this time. Patient had been taking Keytruda for her lung cancer. Patient also has diagnosis of COPD, A. Fib, GERD, and known thyroid nodule-benign. Subjective: Difficulty expressing throughout treatment. Objective  Arrived to patient's room and patient was walking around, her lunch just arrived. Patient ambulated approximately 200' down to the therapy gym, Mod I. Patient performed a warm-up activity on the NuStep (Seat level: 8, Time Duration: 10 minutes, Work load: 5). Patient ambulated down the gift shop and was given a list of items (pen, socks, chips, and candy bar).  Patient instructed to read off list one item at a time. Patient was able to recall 1/4 words correctly. Patient understood what item she was looking for, but was unable to express the word correctly. Patient had difficulty when asked specifically about an object, due to being unable to read the words. For example, patient asked to find the Snickers bar, but was unable to directly find the Snickers bar and would point to different candy bars instead. Patient was able to manipulate elevator functions correctly in order to go up/down to different floors. Patient performed gait activities such as stepping over objects forwards and sideways and was able to complete with Mod I, no LOB. Patient ambulated around the outside terrace across multiple surfaces as well as the ramp and different step lengths, Mod I. Patient reports feeling ready to go home. Patient ambulated over to OT session. Assessment: Pt. is a 61 y/o female with L MCA distribution infarct and is currently being treated for lung CA. Pt. lives with daughter, sister-in-law and grandchildren. Pt. was independent and worke full time. Pt's largest deficit is speech. Pt. improving with functional mobility and is functioning at a Suprevision/Tien level. Pt. given room independence. Pt. continues to benefit from skilled PT       Safety Device - Type of devices:  []  All fall risk precautions in place [] Bed alarm in place  [] Call light within reach [] Chair alarm in place [] Positioning belt [x] Gait belt [] Patient at risk for falls [] Left in bed [] Left in chair [] Telesitter in use [] Sitter present [] Nurse notified []  None    Second Session Therapy Time   Individual Co-treatment   Time In 1300     Time Out 1345     Minutes 45                Electronically signed by Jono Geiger on 4/4/2019 at 1:12 PM   Therapist was present, directed the patient's care, made skilled judgement, and was responsible for assessment and treatment of the patient.       Electronically signed by Parul Cheema, PT on 4/4/2019 at 3:42 PM

## 2019-04-04 NOTE — PROGRESS NOTES
Patient is a 62year old female admitted to rehab with ischemic CVA. She is A&O, however she has expressive aphasia. No device is used, patient is room independent. Currently, she is on a general diet and tolerating her meals well. On Lovenox for DVT prophylaxis. Skin is intact with some bruising in lower abdomen where the Lovenox is injected. Last Tylenol for leg pain was given 0441. HR dropped to 39 bpm but it was back to 67 bpm at 2021 then 59 at 0433. Withheld Metoprolol due to a BP of 102/63 but the Tambocor was still given for HR. Has been NSR throughout the shift. Continent of urine with no BM.

## 2019-04-04 NOTE — PROGRESS NOTES
Speech Language Pathology  ACUTE REHAB UNIT  SPEECH/LANGUAGE PATHOLOGY      [x] Daily  [] Weekly Care Conference Note  [] Discharge    [facility]       :1960  [place]  Rehab Dx/Hx: Acute ischemic stroke ([facility] [address] [I63.9]    Precautions: Aphasia-Communication; Medical  Home situation: Lives with dtr   Dx: [x] Aphasia  [] Dysarthria  [] Apraxia   [x] Oropharyngeal dysphagia [x] Cognitive   Impairment  [] Other:     Initial Speech Therapy Assessment Diagnosis  1. Cognitive Diagnosis: Ongoing assessment. Objective assessment is difficult due to severity of receptive and expressive langauge aphasia  2. Aphasia Diagnosis: Receptive and expressive language aphasia impacting all modalities. Pt's language processing improves with max visual demonstrations. Pt's verbal expression characterized by functional greetings and accasional automatics; but paraphasias, neologisms, perseverations impact expression of needs/wants. pt does attempt to use gestures to compensate. Pt was able to express basic biographical information via written expression. Pt however was unable to write to compensate for verbal expressive language problems. Pt does demonstrate reading processing at word/phrase level. Will intiate therapy  3. Speech Diagnosis: Right facial asym noted during Oral Motor Exam. Pt 's speech is intelligible; but dysphasia errors impact conveying needs/wants. No significant phonemic distortions  4. Dysphagia: Oropharyngeal Dysphagia characterized by prolonged, but functional mastication; variable disorganized A-P oral transit; and delayed initiation of swallow. Despite these problems; no overt clinical s/s of penetration ; no voice quality changes post swallow;no pt complaints post swallow.  Plan to request diet upgrade to regular consistency with thin liquids from dysphagia III    Date of Admit: 3/31/2019  Room #: V0U-2561/3257-01  Date: 2019       Current functional status (updated daily): Pt being seen for : [x] Speech/Language Treatment  [x] Dysphagia Treatment [x] Cognitive Treatment  [] Other:  Current Diet Order:DIET GENERAL;   Communication: []WFL  [x] Aphasia  [] Dysarthria  [] Apraxia  [] Pragmatic Impairment [] Non-verbal  [] Hearing Loss  [] Other:   Cognition: [] WFL  [] Mild  [] Moderate  [] Severe [] Unable to Assess  [] Other:  Memory: [] WFL  [] Mild  [] Moderate  [] Severe [] Unable to Assess  [] Other:  Behavior: [x] Alert  [x] Cooperative  []  Pleasant  [] Confused  [] Agitated  [] Uncooperative  [] Distractible [] Motivated  [] Self-Limiting [] Anxious  [] Other:  Endurance:  [x] Adequate for participation in SLP sessions  [] Reduced overall  [] Lethargic  [] Other:  Safety: [] No concerns at this time  [] Reduced insight into deficits  []  Reduced safety awareness [] Not following call light procedures  [] Unable to Assess  [x] Other: ongoing assessment 2/2 to Aphasia  Swallowing Precautions: Sit up for all meals and thereafter for 30 minutes, Dry swallow after each bite/sip and Check mouth for food residue after snacks and meals  Barriers toward progress: medical issues and caregiver support may be barriers  Discharge recommendations:  [] Home independently  [x] Home with assistance []  24 hour supervision  [] ECF [] Other:  Continued Tx Upon Discharge: ? [x] Yes [] No [] TBD based on progress while on ARU [] Vital Stim indicated [] Other:   Estimated discharge date: TBD         Date: 4/4/2019      Tx session 1 Tx session 2   Total Timed Code Min SLP Individual Minutes  Time In: 0900  Time Out: 0945  Minutes: 39  Coded treatment time  10 SLP Individual Minutes  Time In: 1520  Time Out: 1605  Minutes: 45  Coded treatment time  10   Group Treatment Minutes 0 0   Co-Treat Minutes 0 0   Variance/Reason:  n/a n/a   Pain denied denied   Pain Intervention [] RN notified  [] Repositioned  [] Intervention offered and patient declined  [] N/A  [] Other: [] RN notified  [] ed    Visual Language Processing  -1 step (2 unit) commands: pt/sibling ed    Moderate to max visual models warranted when contextual cues are not available   Goal 3: Pt will verbally express basic biographical information wtih <max assist Written expressive language:   -IND for full name; age; . Address' and 2 family members names  -letter fill in for high frequency words: set of 10 :100%  -CFN for high frequency DL items: set of 10: 40% accurate; improved to 50% for closely approximated. -self formulated hobbies; likes etc: <25%    Verbal Expression of :  -biographical information: Assist for 100% for full name; age; ;Dtr's name and Sister's name.   -Partial verbal expression of full address (assist with city/state)   VErbal expression:   - full name and : pt /sibling ed   Goal 4: PT will demonstrate improved word retrieval for varied naming tasks and word retrieval and thought organization for concrete concept explanation with < moderate       Spontaneous:   -Occasional spontaneous/appropriate 2-3 word utterances    Structured speech tasks:    CFN: reading clocks:   -analog :most optimal on the hour. Assist with on the 1/2 hour and quarter hour  -digital with written word: most optimal for one the hour and/or 1/2 hour. Assist with random times and quarter hour times  CFN (ADL items): 50% sentence completion assist; improved with written word and VAT to 80%    Open ended Wh? Response:   Picture Description: small sample size   Verbal expression at multiple word utterance level regarding pictured stimuli:   - 40% of ideas completely appropriate regarding pictured stimuli. Errors of jargon; phonemic paraphasias.  Pt with inconsistent awareness of jargon and phonemic errors      CFN :  (paced; sentence completion/VAT and at times with written cues): 85%    Oral reading: improving at word level and at 4-5 sentence level       (all activities paced)   Other areas targeted: Cognitive:   -Orientation (use of printed words in a choice format of 4-7)  Oriented to place; month/year; 2/3 daily therapies   Cognitive:   -sorting money according to amounts: pt/sibling ed  -counting money for amount identified: pt/sibling ed  -writing checks: pt/sibling ed  -reading med labels: pt/sibling ed    Home activities suggested; communication strategies   Education:   Ongoing pt ed Ongoing pt ed   Safety Devices: [] Call light within reach  [] Chair alarm activated  [] Bed alarm activated  [x] Other: transport returned pt to room [] Call light within reach  [] Chair alarm activated  [] Bed alarm activated  [x] Other: transport returned pt to room    Progress Assessment: 4/2/2019: continue treatment goals. Pt responding well to therapy and structured speech tasks incorporating varied assistive strategies. Sibling here for one of the sessions for family ed. Recommended diet upgrade to regular consistency  4/3/2019: continue daily progress   Plan: Continue as per plan of care. Additional information:     Interventions used during Rehab Stay:  [x] Speech/Language Treatment  [] Instruction in HEP [] Group [x] Dysphagia Treatment [x] Cognitive Treatment   [] Other:     Adverse Reactions to Treatment/Significant Change in Status:       Electronically Signed by    Kasia Whitley. Bryanna,MS,CCC,SLP [id number]  Speech and Language Pathologist

## 2019-04-04 NOTE — PROGRESS NOTES
Occupational Therapy  Facility/Department: [address] REHAB  Daily Treatment Note  NAME: Jayson Marx  : 1960  MRN: [id number]    Date of Service: 2019    Discharge Recommendations:  Home with assist PRN, 24 hour supervision or assist, Continue to assess pending progress       Assessment   Performance deficits / Impairments: Decreased functional mobility ; Decreased safe awareness;Decreased high-level IADLs;Decreased ADL status; Decreased balance  Assessment: Pt tolerated session well. Pt required cues to attend to the right side to find pieces on the table. Pt completed Trails Making to assess cogitive functioning for driving. Pt would benefit from a  evaluation when the doctor deems appropriate. Feel pt would benefit from waiting to attempt driving to allow more time to heal from her CVA. Pt demonstrated good safety in the kitchen. Treatment Diagnosis: Decreased adl, communication, functional mobility  Prognosis: Good  Patient Education: Safe transfers, driving assessment, improving cognition  REQUIRES OT FOLLOW UP: Yes  Activity Tolerance  Activity Tolerance: Patient Tolerated treatment well  Safety Devices  Type of devices: Gait belt         Patient Diagnosis(es): There were no encounter diagnoses. has a past medical history of Smoking history. has no past surgical history on file. Restrictions  Restrictions/Precautions  Restrictions/Precautions: Fall Risk  Position Activity Restriction  Other position/activity restrictions: aphasia, exp and poss receptive, telemetry  Subjective   General  Chart Reviewed: Yes  Additional Pertinent Hx: Lung CA with bone mets (poss at C4), COPD, A fib, PE  Family / Caregiver Present: No  Referring Practitioner: Dr. Phill Davison  Diagnosis: Stroke; L M2 occlusion and thrombectomy  Subjective  Subjective: Pt seen in department.   General Comment  Comments: denies pain, has aphasia but able to follow commands, seems very accuate w/ yes & no responses; agreeable for shower      Objective       Meal Prep  Meal Prep Level: Other(no device)  Meal Prep Level of Assistance: Supervision  Meal Preparation: Pt prepared food on the stove. Pt correctly picked the burner and turned it on to the appropriate temperature. She was able to cook the item correctly and turned off the burner without cues. she was able to clean all dishes at the sink. Pt demnonstrated good safety in the kitchen with all tasks. Wheelchair Bed Transfers  Wheelchair/Bed - Technique: Ambulating  Equipment Used: Wheelchair; Other(chair with arms)  Level of Asssistance: Supervision        Cognition  Cognition Comment: Completed trials making parts A and B to assess cognitive functioning for driving. Part A completed in 56.44 seconds with a norm of 35.10 seconds. completed part B with 152.32 with a norm of 78.84 seconds. Pt would likely benefit from waiting a little longer until attempting to drive. Pt would benefit from a formal  evaluation due to aphasia and decreased cognition. Completed superfection with 3 D pieces. Pt required cues with completing pieces with all of the pieces on the table. Pt was able to complete the pieces better when there were less choices on the table. Pt required cues to locate pieces on the right side of the table. Pt initially required cues to rotate pieces to make them fit but pt did rotate other pieces without cues after trying initial pieces.             Plan   Plan  Times per week: 5-6 x week  Times per day: Twice a day  Plan weeks: DC FRI after PM therapies   Specific instructions for Next Treatment: car t/f, vision testing  Current Treatment Recommendations: Neuromuscular Re-education, Patient/Caregiver Education & Training, Equipment Evaluation, Education, & procurement, Self-Care / ADL, Safety Education & Training, Home Management Training, Cognitive/Perceptual Training, Functional Mobility Training, Cognitive Reorientation, Balance Training    Goals  Short term goals  Time Frame for Short term goals: 1 week  Short term goal 1: Pt will be Independent with functional transfers  Short term goal 2: Pt will be Independent with functional mobility  Short term goal 3: Pt will be Independent with toileting--goal met 4/3/19  Short term goal 4: Pt will be Independent with bathing and dressing  Short term goal 5: Pt will be Independent with light meal prep  Short term goal 6: Pt will undergo further cognition testing/home safety evaluation/tx to meet above goals  Long term goals  Time Frame for Long term goals : STG = LTG  Patient Goals   Patient goals : \"to get better\"       Therapy Time   Individual Concurrent Group Co-treatment   Time In 0815         Time Out 0900         Minutes 1200 [facility], [address]

## 2019-04-04 NOTE — PROGRESS NOTES
Department of [facility] Progress Note    Patient Identification:  Gt Covington  [id number]  : 1960  Admit date: 3/31/2019      Diagnosis:   Patient Active Problem List   Diagnosis    Acute ischemic stroke [facility])           Subjective: Pt seen this AM. Patient was discussed yesterday in [employer] with entire Rehab Team, and we think patient will be ready for discharge to home this Saturday with continued therapies after discharge to include occupational therapy and speech. Will fill out her DHEERAJ and meds tomorrow for her discharge Saturday. Will also need to fill out her FMLA papers for her job. She is talking better with her production of speech after working with speech therapy. She will need long-term speech therapy after discharge.       /76   Pulse 63   Temp 97.6 °F (36.4 °C) (Oral)   Resp 16   Ht 5' 9\" (1.753 m)   Wt 184 lb 8.4 oz (83.7 kg)   SpO2 100%   BMI 27.25 kg/m²     Last 24 hour lab  Recent Results (from the past 24 hour(s))   Basic Metabolic Panel w/ Reflex to MG    Collection Time: 19  6:15 AM   Result Value Ref Range    Sodium 141 136 - 145 mmol/L    Potassium reflex Magnesium 3.8 3.5 - 5.1 mmol/L    Chloride 103 99 - 110 mmol/L    CO2 28 21 - 32 mmol/L    Anion Gap 10 3 - 16    Glucose 94 70 - 99 mg/dL    BUN 13 7 - 20 mg/dL    CREATININE <0.5 (L) 0.6 - 1.1 mg/dL    GFR Non-African American >60 >60    GFR African American >60 >60    Calcium 9.3 8.3 - 10.6 mg/dL   CBC auto differential Monday/thursday    Collection Time: 19  6:15 AM   Result Value Ref Range    WBC 5.0 4.0 - 11.0 K/uL    RBC 3.63 (L) 4.00 - 5.20 M/uL    Hemoglobin 11.6 (L) 12.0 - 16.0 g/dL    Hematocrit 33.3 (L) 36.0 - 48.0 %    MCV 91.6 80.0 - 100.0 fL    MCH 32.0 26.0 - 34.0 pg    MCHC 34.9 31.0 - 36.0 g/dL    RDW 12.5 12.4 - 15.4 %    Platelets 621 776 - 324 K/uL    MPV 7.2 5.0 - 10.5 fL    Neutrophils % 51.8 %    Lymphocytes % 32.2 %    Monocytes % 9.5 % Eosinophils % 5.3 %    Basophils % 1.2 %    Neutrophils # 2.6 1.7 - 7.7 K/uL    Lymphocytes # 1.6 1.0 - 5.1 K/uL    Monocytes # 0.5 0.0 - 1.3 K/uL    Eosinophils # 0.3 0.0 - 0.6 K/uL    Basophils # 0.1 0.0 - 0.2 K/uL       Therapy progress:  PT  Position Activity Restriction  Other position/activity restrictions: aphasia, exp and poss receptive, telemetry  Objective     Sit to Stand: Modified independent  Stand to sit: Modified independent  Bed to Chair: Modified independent  Device: No Device  Assistance: Supervision, Modified Independent  Distance: 1250', short distances in room  OT  PT Equipment Recommendations  Equipment Needed: No  Toilet - Technique: Ambulating  Equipment Used: Grab bars  Toilet Transfers Comments: used GB w/ R hand          SLP:   Cognitive Diagnosis: Ongoing assessment. Objective assessment is difficult due to severity of receptive and expressive langauge aphasia  Aphasia Diagnosis: Receptive and expressive language aphasia impacting all modalities. Pt's language processing improves with max visual demonstrations. Pt's verbal expression characterized by functional greetings and accasional automatics; but paraphasias, neologisms, perseverations impact expression of needs/wants. pt does attempt to use gestures to compensate. Pt was able to express basic biographical information via written expression. Pt however was unable to write to compensate for verbal expressive language problems. Pt does demonstrate reading processing at word/phrase level. Will intiate therapy  Speech Diagnosis: Right facial asym noted during Oral Motor Exam. Pt 's speech is intelligible; but dysphasia errors impact conveying needs/wants. No significant phonemic distortions  Dysphagia Diagnosis: (Minimal to MIld Oropharyngeal Dysphagia characterized by prlonged mastication; lingual pumping to initiate A-P oral transit and delayed initiation of swallow.  No immediate overt clinical s/s post swallow; no pt complaints post swallow; no voice qulatiy c)  Dysphagia Impression : (changes post swallow. Will rec ommend diet upgrade to regular. Await MD orders if in agreement)  Dysphagia Outcome Severity Scale: Level 6: Within functional limits/Modified independence       Assessment and Plan:       Left MCA infarction-right hemiparesis, and aphasia-Lovenox       Metastatic adenocarcinoma of lung-keytruda      COPD-Dulera ,      A. Fib-  flecainide, Lopressor      low cortisol and ACTH-continue home hydrocortisone.     HLD-Lipitor      GERD-Protonix       thyroid nodule-benign.     Bowels: Schedule Miralax + Senna S. Follow bowel movements. Enema or suppository if needed.      Bladder: Check PVR x 3.   [address] if PVR > 200ml or if any volume is > 500 ml.      Pain: Tylenol is ordered prn.      ELOS: 4/6      Peyton Catalan MD, 4/4/2019, 8:20 AM

## 2019-04-04 NOTE — PROGRESS NOTES
Occupational Therapy  OCCUPATIONAL THERAPY  Progress Note   Second Session    Patient Name: Abdulkadir Shaikh  Medical Record Number: [id number]    Treatment Diagnosis: decreased functional mobility    General  Chart Reviewed: Yes  Additional Pertinent Hx: Lung CA with bone mets (poss at C4), COPD, A fib, PE  Family / Caregiver Present: No  Referring Practitioner: Dr. Marleny Mason  Diagnosis: Stroke; L M2 occlusion and thrombectomy     Restrictions/Precautions  Restrictions/Precautions: Fall Risk        Position Activity Restriction  Other position/activity restrictions: aphasia, exp and poss receptive, telemetry    Subjective: Difficulty verbally expressing throughout treatment but able to follow simple commands    Objective: agreeable for maze & puzzle tasks, household t/f    Assessment: Pt tolerated session well. She reported mild R thigh \"soreness\" but able to complete t/f w/ MI. She was able to complete simple tracing/maze task however used the same paths for several of them. Pt completed puzzle of [place]--pt started w/ more larger familiar states however completed the task IND'ly & within appropriate amount of time. Pt also completed pipe fitting task, starting w/ simple to more complex design--she required min VCs at the beginning to ensure she was using the right size pieces, however after this minor correction, she was IND w/ remainder of complex tasks. Pt ambulated back to her room without AD--MI for safety concerns and pathfinding--could not recall her room number or where her room is located. Aware she is scheduled for shower tomorrow for final ADL. Currently has room IND.  Cont w/ POC, tx time: 39 min Oral Area, OTR/L #[id number]    Safety Device - Type of devices:  []  All fall risk precautions in place [] Bed alarm in place  [] Call light within reach [] Chair alarm in place [] Positioning belt [x] Gait belt [] Patient at risk for falls [] Left in bed [] Left in chair [] Telesitter in use [] Sitter present [] Nurse notified []  None      Therapy Time   Individual Co-treatment   Time In 4373     Time Out 1430     Minutes 45       Electronically signed by MAXIMILIAN Sanchez/L #[id number] on 4/4/2019 at 1:53 PM

## 2019-04-04 NOTE — PLAN OF CARE
Problem: Falls - Risk of:  Goal: Will remain free from falls  Description  Will remain free from falls  Outcome: Ongoing  Note:   Alan Watson 468 RECORD NUMBER:  [id number]  AGE: 62 y.o. GENDER: female  : 1960  TODAYS DATE:  2019    Assessment     Previous Falls: Yes prior to admission     Patient Alert: yes  Patient Oriented: oriented in all spheres    Level of Assistance: Independent  Device in Use: None    Capone Fall Score: Score: 75     in Use: no     Prevention Plan     Nursing Actions Taken: [place] Harps all that apply)    ? Belongings in Reach according to Patient Preference  ? Call Light within Reach and Patient Reminded to Call for Assistance  ? Shoes on Patient  ? Non-Skid Socks on Patient  ? Hourly Rounding Performed  ? Bed Alarm on Patient  ? Chair Alarm on Patient  ?  Other:    Bed Rails in Use: 2 of 4    Patient Verbalized Understanding: Yes  Family Present: No    Electronically signed by Mary Rivera RN on 2019 at 9:13 AM     Goal: Absence of physical injury  Description  Absence of physical injury  Outcome: Ongoing     Problem: HEMODYNAMIC STATUS  Goal: Patient has stable vital signs and fluid balance  Outcome: Ongoing     Problem: ACTIVITY INTOLERANCE/IMPAIRED MOBILITY  Goal: Mobility/activity is maintained at optimum level for patient  Outcome: Ongoing     Problem: COMMUNICATION IMPAIRMENT  Goal: Ability to express needs and understand communication  Outcome: Ongoing     Problem: SKIN INTEGRITY  Goal: LTG - Patient will be free from infection  Outcome: Ongoing  Goal: LTG - patient will maintain/improve skin integrity through proper skin care techniques  Outcome: Ongoing     Problem: Pain:  Goal: Pain level will decrease  Description  Pain level will decrease  Outcome: Ongoing  Goal: Control of chronic pain  Description  Control of chronic pain  Outcome: Ongoing  Note:   PAIN MANAGEMENT    Jony Luke  MEDICAL RECORD NUMBER: [id number]  AGE: 62 y.o. GENDER: female  : 1960  TODAYS DATE:  2019    Assessment     Patient in Pain: yes    Pain Level: Pain Level: 2 of 10  Patient's Personal Pain Goal: Patient's Stated Pain Goal: No pain    Pain Medication Orders: Tylenol     Interventions     Nursing Actions Taken: Liza Mcneal all that apply)    ? Cold  ? Heat  ? Elevated  ? Position Change  ? PRN Medication Use  ?  Other:    Patient Verbalized Understanding: Yes  Family Present: No    Electronically signed by Leanna Hunt RN on 2019 at 9:14 AM

## 2019-04-05 PROCEDURE — 6370000000 HC RX 637 (ALT 250 FOR IP): Performed by: PHYSICAL MEDICINE & REHABILITATION

## 2019-04-05 PROCEDURE — 97110 THERAPEUTIC EXERCISES: CPT | Performed by: PHYSICAL THERAPIST

## 2019-04-05 PROCEDURE — 97530 THERAPEUTIC ACTIVITIES: CPT | Performed by: PHYSICAL THERAPIST

## 2019-04-05 PROCEDURE — 1280000000 HC REHAB R&B

## 2019-04-05 PROCEDURE — 97127 HC SP THER IVNTJ W/FOCUS COG FUNCJ: CPT

## 2019-04-05 PROCEDURE — 94760 N-INVAS EAR/PLS OXIMETRY 1: CPT

## 2019-04-05 PROCEDURE — 6360000002 HC RX W HCPCS: Performed by: PHYSICAL MEDICINE & REHABILITATION

## 2019-04-05 PROCEDURE — 97530 THERAPEUTIC ACTIVITIES: CPT

## 2019-04-05 PROCEDURE — 92507 TX SP LANG VOICE COMM INDIV: CPT

## 2019-04-05 PROCEDURE — 97110 THERAPEUTIC EXERCISES: CPT

## 2019-04-05 PROCEDURE — 97535 SELF CARE MNGMENT TRAINING: CPT

## 2019-04-05 PROCEDURE — 94640 AIRWAY INHALATION TREATMENT: CPT

## 2019-04-05 PROCEDURE — 97116 GAIT TRAINING THERAPY: CPT | Performed by: PHYSICAL THERAPIST

## 2019-04-05 RX ORDER — ATORVASTATIN CALCIUM 40 MG/1
40 TABLET, FILM COATED ORAL NIGHTLY
Qty: 30 TABLET | Refills: 2 | Status: SHIPPED | OUTPATIENT
Start: 2019-04-05

## 2019-04-05 RX ADMIN — FLECAINIDE ACETATE 50 MG: 100 TABLET ORAL at 22:04

## 2019-04-05 RX ADMIN — MOMETASONE FUROATE AND FORMOTEROL FUMARATE DIHYDRATE 2 PUFF: 200; 5 AEROSOL RESPIRATORY (INHALATION) at 20:14

## 2019-04-05 RX ADMIN — HYDROCORTISONE 10 MG: 10 TABLET ORAL at 13:41

## 2019-04-05 RX ADMIN — FLECAINIDE ACETATE 50 MG: 100 TABLET ORAL at 07:23

## 2019-04-05 RX ADMIN — ENOXAPARIN SODIUM 80 MG: 80 INJECTION SUBCUTANEOUS at 22:04

## 2019-04-05 RX ADMIN — MOMETASONE FUROATE AND FORMOTEROL FUMARATE DIHYDRATE 2 PUFF: 200; 5 AEROSOL RESPIRATORY (INHALATION) at 08:16

## 2019-04-05 RX ADMIN — ACETAMINOPHEN 650 MG: 325 TABLET, FILM COATED ORAL at 10:10

## 2019-04-05 RX ADMIN — FOLIC ACID 1 MG: 1 TABLET ORAL at 07:23

## 2019-04-05 RX ADMIN — PANTOPRAZOLE SODIUM 40 MG: 40 TABLET, DELAYED RELEASE ORAL at 06:19

## 2019-04-05 RX ADMIN — METOPROLOL TARTRATE 12.5 MG: 25 TABLET ORAL at 07:23

## 2019-04-05 RX ADMIN — MICONAZOLE NITRATE 100 MG: 100 SUPPOSITORY VAGINAL at 22:14

## 2019-04-05 RX ADMIN — HYDROCORTISONE 20 MG: 10 TABLET ORAL at 07:24

## 2019-04-05 RX ADMIN — CLONAZEPAM 0.5 MG: 0.5 TABLET ORAL at 22:03

## 2019-04-05 RX ADMIN — CLONAZEPAM 0.5 MG: 0.5 TABLET ORAL at 07:23

## 2019-04-05 RX ADMIN — ENOXAPARIN SODIUM 80 MG: 80 INJECTION SUBCUTANEOUS at 09:05

## 2019-04-05 RX ADMIN — ATORVASTATIN CALCIUM 40 MG: 40 TABLET, FILM COATED ORAL at 22:03

## 2019-04-05 RX ADMIN — METOPROLOL TARTRATE 12.5 MG: 25 TABLET ORAL at 22:03

## 2019-04-05 RX ADMIN — THERA TABS 1 TABLET: TAB at 07:24

## 2019-04-05 ASSESSMENT — PAIN SCALES - GENERAL
PAINLEVEL_OUTOF10: 4
PAINLEVEL_OUTOF10: 0

## 2019-04-05 ASSESSMENT — PAIN DESCRIPTION - ORIENTATION: ORIENTATION: RIGHT

## 2019-04-05 ASSESSMENT — PAIN - FUNCTIONAL ASSESSMENT: PAIN_FUNCTIONAL_ASSESSMENT: ACTIVITIES ARE NOT PREVENTED

## 2019-04-05 ASSESSMENT — PAIN DESCRIPTION - ONSET: ONSET: ON-GOING

## 2019-04-05 ASSESSMENT — PAIN DESCRIPTION - PROGRESSION
CLINICAL_PROGRESSION: NOT CHANGED
CLINICAL_PROGRESSION: NOT CHANGED

## 2019-04-05 ASSESSMENT — PAIN DESCRIPTION - FREQUENCY: FREQUENCY: INTERMITTENT

## 2019-04-05 ASSESSMENT — PAIN DESCRIPTION - LOCATION: LOCATION: LEG

## 2019-04-05 ASSESSMENT — PAIN DESCRIPTION - DESCRIPTORS: DESCRIPTORS: ACHING;CONSTANT

## 2019-04-05 ASSESSMENT — PAIN DESCRIPTION - PAIN TYPE: TYPE: ACUTE PAIN

## 2019-04-05 NOTE — PROGRESS NOTES
Patient with no complaints from previous session. Family / Caregiver Present: No  Referring Practitioner: Dr. Saumya Nesbitt Phi: Patient with no c/o, did say has R hip discomfort with long distance ambulation. General Comment  Comments: patient does need gestures to [place] occasionally          Orientation  Orientation  Overall Orientation Status: (see speech , difficult to assess due to expressive aphasia)  Cognition      Objective   Bed mobility  Bridging: Independent  Rolling to Left: Independent  Rolling to Right: Independent  Supine to Sit: Independent  Sit to Supine: Independent  Scooting: Independent  Transfers  Sit to Stand: Independent  Stand to sit:  Independent  Bed to Chair: Independent  Ambulation  Ambulation?: Yes  Ambulation 1  Surface: level tile;carpet  Device: No Device  Assistance: Modified Independent  Quality of Gait: Patient ambulated with good hernandez, with minimally decreased clearance on R with occassional shuffle, limited weightshift over R LE with swing through of L LE, tends to ER R LE, generally steady with no loss of balance  Distance: 250' 150'  Comments: No LOB; limit distance due to R hip discomfort  Stairs/Curb  Stairs?: Yes  Stairs  # Steps : 12  Stairs Height: 6\"  Rails: (1 or no rails)  Device: No Device  Assistance: Modified independent   Comment: Patient MI with steps with rail, curb step with rail        Exercises  Gluteal Sets: 15  Ankle Pumps: 20         Other Activities: Other (see comment)  Comment: Patient able to  object independently  Second Session-  S/ Patient agreeable to therapy  O/ Transfers -  Independent  Ambulated without  ' with independence  Car transfer with MI cues for safety, not to place L foot in car prior to sitting  Patient reviewed and given standing HEP- toe raises/heel rocks x 15, marches x 20, mini squats x 15 , rest, hip abduction and extension x 15, knee flexion x 15  Performed Nustep x 10 minutes WL 4, 662 steps, HR 92, O2 sats 94%  Patient to w/c with independence, to OT session  Assessment   Assessment: Pt. is a 61 y/o female with L MCA distribution infarct and is currently being treated for lung CA. Pt. lives with daughter, sister-in-law and grandchildren. Pt. was independent and worked full time. Pt's main deficit is speech- global aphasia. Patient has met all LTG Pt. independent with  functional mobility using no device. Patient performed 12 steps with rail with MI  Pt. given room independence. Pt. to be D/C tomorrow , 4/6, with no home PT. Plan to see tthis afternoon to review HEP ,and safety with mobility.   Treatment Diagnosis: decreased functional mobility  Specific instructions for Next Treatment: high level balance activities, review HEP  Prognosis: Good  Patient Education: safety with functional mobility, sitting exercises  Barriers to Learning: global aphasia  REQUIRES PT FOLLOW UP: Yes  Activity Tolerance  Activity Tolerance: Patient Tolerated treatment well          Goals  Short term goals  Time Frame for Short term goals: 5-7 days  Short term goal 1: transfers independent including bed mobility- met 4/3  Short term goal 2: ambulate community distances without AD independently- met 4/5  Short term goal 3: performed 12 steps with rail with MI- met 4/5  Short term goal 4: curb step with MI- met 4/5  Long term goals  Time Frame for Long term goals : STG = LTG  Patient Goals   Patient goals : Patient's goal is difficult to verbalize unable to verbalize, but did say automatically that wants to get better    Plan    Plan  Times per week: 5-6x  Times per day: Daily  Plan weeks: < 1 week  Specific instructions for Next Treatment: high level balance activities, review HEP  Current Treatment Recommendations: Strengthening, Balance Training, Functional Mobility Training, Transfer Training, Endurance Training, Gait Training, Stair training, Safety Education & Training, Home Exercise Program, Patient/Caregiver

## 2019-04-05 NOTE — CARE COORDINATION
SOCIAL WORK DISCHARGE SUMMARY:      DISCHARGE DATE:                 Saturday, 4-6-2019      DISCHARGE PLACE:                Home                 HOME CARE AGENCY:            [address]             PHONE NUMBER          [phone]             FAX NUMBER                 [phone]      TRANSPORTATION:                Family             TIME:                                10-12 noon      PREFERRED PHARMACY:         [facility]             NUMBER:                            MD orders:   SN/Ot/SP    DME:  None.     [place], [place]     Case Management   [phone]    4/5/2019  12:05 PM

## 2019-04-05 NOTE — PROGRESS NOTES
Patient was admitted on 3/31 for acute ischemic stroke. Patient is A&O x4, with expressive aphasia. Patient does complain of pain, rated 5 of 10. . Patient transfers with walker and is room independent. All PM medications were given without issue. Patient is continent of bowels and bladder. Last BM 4/4. Patient has right chest port. Patient is sleeping soundly all shift. Call light is within reach. Patient calls as needed.

## 2019-04-05 NOTE — PROGRESS NOTES
Department of [facility] Progress Note    Patient Identification:  Mickey Roy  [id number]  : 1960  Admit date: 3/31/2019      Diagnosis:   Patient Active Problem List   Diagnosis    Acute ischemic stroke [facility])           Subjective: Pt seen this AM. Patient has made good improvement in her therapies over the past week. We think patient will be ready for discharge to home  tomorrow with continued therapies after discharge to include occupational therapy and speech. Will fill out her DHEERAJ and meds today for her discharge Saturday. Nurses also noted a vaginal discharge last night consistent with a yeast infection, and a one-time dose of Monistat was ordered for her. I also have filled out her FMLA papers for her job to keep her off work until August to allow enough time to heal up from her stroke. She will need reassessment of her ability to return to work at that time. She is talking better with her production of speech after working with speech therapy, but she will need long-term speech therapy after discharge. She'll follow-up with her cardiologist and oncologist after discharge. /76   Pulse 58   Temp 97.6 °F (36.4 °C) (Oral)   Resp 16   Ht 5' 9\" (1.753 m)   Wt 188 lb 7.9 oz (85.5 kg)   SpO2 94%   BMI 27.84 kg/m²     Last 24 hour lab  No results found for this or any previous visit (from the past 24 hour(s)).     Therapy progress:  PT  Position Activity Restriction  Other position/activity restrictions: aphasia, exp and poss receptive, telemetry  Objective     Sit to Stand: Modified independent  Stand to sit: Modified independent  Bed to Chair: Modified independent  Device: No Device  Assistance: Supervision, Modified Independent  Distance: 36' x 1 and short distances around room  OT  PT Equipment Recommendations  Equipment Needed: No  Other: Amb. without an AD  Toilet - Technique: Ambulating  Equipment Used: Grab bars  Toilet Transfers Comments: used GB w/ R hand          SLP:   Cognitive Diagnosis: Ongoing assessment. Objective assessment is difficult due to severity of receptive and expressive langauge aphasia  Aphasia Diagnosis: Receptive and expressive language aphasia impacting all modalities. Pt's language processing improves with max visual demonstrations. Pt's verbal expression characterized by functional greetings and accasional automatics; but paraphasias, neologisms, perseverations impact expression of needs/wants. pt does attempt to use gestures to compensate. Pt was able to express basic biographical information via written expression. Pt however was unable to write to compensate for verbal expressive language problems. Pt does demonstrate reading processing at word/phrase level. Will intiate therapy  Speech Diagnosis: Right facial asym noted during Oral Motor Exam. Pt 's speech is intelligible; but dysphasia errors impact conveying needs/wants. No significant phonemic distortions  Dysphagia Diagnosis: (Minimal to MIld Oropharyngeal Dysphagia characterized by prlonged mastication; lingual pumping to initiate A-P oral transit and delayed initiation of swallow. No immediate overt clinical s/s post swallow; no pt complaints post swallow; no voice qulatiy c)  Dysphagia Impression : (changes post swallow. Will rec ommend diet upgrade to regular. Await MD orders if in agreement)  Dysphagia Outcome Severity Scale: Level 6: Within functional limits/Modified independence       Assessment and Plan:       Left MCA infarction-right hemiparesis, and aphasia-Lovenox       Metastatic adenocarcinoma of lung-keytruda      COPD-Dulera       A. Fib-  flecainide, Lopressor      low cortisol and ACTH-continue home hydrocortisone.     HLD-Lipitor      GERD-Protonix       thyroid nodule-benign.     Bowels: Schedule Miralax + Senna S. Follow bowel movements. Enema or suppository if needed.      Bladder: Check PVR x 3.   [address] if PVR > 200ml or if any volume is > 500 ml.      Pain: Tylenol is ordered prn.      ELOS: 4/6 Discharge to home tomorrow with home therapies.       Yanira Viera MD, 4/5/2019, 7:17 AM

## 2019-04-05 NOTE — PLAN OF CARE
Problem: Falls - Risk of:  Goal: Will remain free from falls  Description  Will remain free from falls  Outcome: Ongoing  Note:   Patient is a low fall risk. Patient free of falls this shift. Bed low and locked at all times. Call light and bedside table is within reach. Discussed with patient the need to call out for assistance before getting up when if needed. Patient verbalized understanding. Problem: SKIN INTEGRITY  Goal: LTG - Patient will be free from infection  Outcome: Ongoing  Note:   Pt is showing no signs and symptoms of infection. Pt educated on signs and symptoms of infection to watch out for. Pt is agreeable to notify nurse if signs start to show. Handwashing is performed upon entering patients to reduce the risk of infection. Will continue to assess and monitor. Problem: SKIN INTEGRITY  Goal: STG - patient will maintain good skin integrity  Outcome: Ongoing  Note:   Patient is at risk for impaired skin integrity. Pt is able to turn and reposition self as needed. Skin is assessed every shift and prn. Patient has pillow support, to help reduce skin breakdown. Will continue to monitor. Problem: Pain:  Goal: Pain level will decrease  Description  Pain level will decrease  Outcome: Ongoing  Note:   Patient is having right leg pain. Pain score is 5 out of 10. Patient denied any needs for pain medicine when it was time for next dose. Will continue to monitor.

## 2019-04-05 NOTE — PROGRESS NOTES
devices: Call light within reach; Left in bed         Patient Diagnosis(es): There were no encounter diagnoses. has a past medical history of Smoking history. has no past surgical history on file. Restrictions  Restrictions/Precautions  Restrictions/Precautions: Fall Risk  Position Activity Restriction  Other position/activity restrictions: aphasia, exp and receptive, telemetry, has Room IND no device  Subjective   General  Chart Reviewed:  Yes  Additional Pertinent Hx: Lung CA with bone mets (poss at C4), COPD, A fib, PE  Family / Caregiver Present: No  Referring Practitioner: Dr. Frandy Moffett  Diagnosis: Stroke; L M2 occlusion and thrombectomy  Subjective  Subjective: met in room, pt up ad augustine--has room IND  General Comment  Comments: reporting 5/10 R quad pain, RN notified & gave tylenol; agreeable for shower  Pain Assessment  Clinical Progression: Not changed  Response to Pain Intervention: Patient Satisfied   Orientation  Orientation  Overall Orientation Status: Within Normal Limits  Orientation Level: Oriented X4(struggles to state accurately d/t expressive aphasia, sometimes reverses order of yr, states \"1920\")  Objective    ADL  Feeding: Independent  Grooming: Independent  UE Bathing: Setup(OT covered R chest port site w/ tegaderm)  LE Bathing: Supervision;Verbal cueing(stood for entire shower (shower chair available if needed), pt used [email or web address] but required A to hang it on hook, gave min VCs to sit down to dry feet, tried to walk away from shower stall when drying legs but her cardiac monitor bag was tied to GB,)  UE Dressing: Independent( pt retrieved her clothes & donned IND'ly)  LE Dressing: Modified independent (pt gathered her pants, safety concerns while walking around in plain socks, but later donned her shoe;  alternated sitting & standing to don clothing over hips, no LOB)  Toileting: Modified independent (used GB intermittently; able to manage clothing down, up & wipes IND'ly)  Additional Comments: Stood in shower for bathing, OT had to manage cardiac monitor & cover R upper chest port, pt gathered own clothes & dressed LB while standing, No LOB or dizziness        Balance  Sitting Balance: Independent  Standing Balance: Independent  Standing Balance  Time: 8 minutes  Activity: during fxl mob, ADLs  Sit to stand: Independent  Stand to sit: Independent  Comment: pt can be a little impulsive but no LOB occurred, not using AD during t/f & fxl mob; gathered clothing from closet & able to bend down to get item off floor  Functional Mobility  Functional - Mobility Device: No device  Activity: Retrieve items;Transport items; To/from bathroom  Assist Level: Modified independent   Functional Mobility Comments: pt given Room IND no AD; safety concerns when ambulating outside of her room, requires cues for pathfinding, no LOB  Toilet Transfers  Toilet - Technique: Ambulating  Equipment Used: Grab bars  Toilet Transfer: Modified independent  Toilet Transfers Comments: used GB w/ R hand  Shower Transfers  Shower - Transfer From: Other  Shower - Transfer Type: To and From  Shower - Transfer To: Standing  Shower - Technique: Ambulating  Shower Transfers: Modified independence  Shower Transfers Comments: completed shower transfer, did not use shower chair but was available; used GB intermittently  Wheelchair Bed Transfers  Wheelchair/Bed - Technique: Ambulating  Equipment Used: Bed  Level of Asssistance: Independent  Wheelchair Transfers Comments: transfer in & out of recliner and bed IND'ly no AD used however mild impulsiveness noted  Bed mobility  Rolling to Left: Independent  Rolling to Right: Independent  Supine to Sit: Independent  Sit to Supine: Independent  Scooting: Independent  Comment: bed flat no rails  Transfers  Sit to stand: Independent  Stand to sit:  Independent  Transfer Comments: ambulates & completes t/f without AD                    Vision  Vision Comment: pt denies any new problems, wears glasses AATs--pt able to demonstrate the safer method to enter car by sitting down bottom first         PM session: met in therapy dept, she just finished PT and complete car t/f w/ MI. She is agreeable for UE HEP using blue theraband; pt forgets to count and required demonstration for each of the exercises. Completed 1 set of 10 for each of the 6 exercises. Pt completed microwave meal prep task--she was able to gather oatmeal & milk from fridge and bowl from cabinet w/ min VCs. Pt first grabbed a small plastic bowl which wasn't microwave safe, did not see the pyrex glass bowl directly in front, so OT asked her to grab it. Pt able to open all containers and pour ingredients into bowl, able to measure milk however used more than directions called for, able to state how long to place in microwave but needed min VCs to select correct time on microwave. Pt somewhat sloppy when cleaning up supplies. Pt ambulated to ortho gym to complete car t/f--forgetful of how to navigate to & from therapy<>ortho gym, no LOB however SOB noted. Checked pulse ox at 94%, HR 80, pt is a former smoker & has COPD. tx time: 45 min,  DC to home w/ family support.   Susan Alatorre, OTR/L #[id number]    Plan   Plan  Times per week: 5-6 x week  Times per day: Twice a day  Plan weeks: DC FRI after PM therapies   Specific instructions for Next Treatment: car t/f, vision testing  Current Treatment Recommendations: Neuromuscular Re-education, Patient/Caregiver Education & Training, Equipment Evaluation, Education, & procurement, Self-Care / ADL, Safety Education & Training, Home Management Training, Cognitive/Perceptual Training, Functional Mobility Training, Cognitive Reorientation, Balance Training    Goals  Short term goals  Time Frame for Short term goals: 1 week  Short term goal 1: Pt will be Independent with functional transfers--goal met 4/4/19  Short term goal 2: Pt will be Independent with functional mobility--goal met 4/4/19  Short term goal 3: Pt will be

## 2019-04-05 NOTE — PROGRESS NOTES
Pt is 58yr old admitted on 3/31/19 s/p ischemic CVA. A&O x 4, pleasant and cooperative. Expressive aphasia noted, pt continues to be room independent at this time and is set to discharge to home tomorrow 4/6/19. PT took AM medication without difficulty, denies any needs a this time. Will continue to monitor.

## 2019-04-05 NOTE — DISCHARGE SUMMARY
Occupational Therapy  Discharge Summary     Mathew Lozano  EJGENNARO:[id number]  :1960  Treatment Diagnosis: decreased functional mobility  Diagnosis: CVA    Restrictions/Precautions  Restrictions/Precautions: Fall Risk           Position Activity Restriction  Other position/activity restrictions: aphasia, exp and receptive, telemetry, has Room IND no device     Goals:   Short term goals  Time Frame for Short term goals: 1 week  Short term goal 1: Pt will be Independent with functional transfers--goal met 19  Short term goal 2: Pt will be Independent with functional mobility--goal met 19  Short term goal 3: Pt will be Independent with toileting--goal met 4/3/19  Short term goal 4: Pt will be Independent with bathing and dressing--goal met 19  Short term goal 5: Pt will be Independent with light meal prep  Short term goal 6: Pt will undergo further cognition testing/home safety evaluation/tx to meet above goals--goal met 4/3 and , pt should wait to drive and return to work x at least 3 months   Long term goals  Time Frame for Long term goals : STG = LTG    Pt.  Met  short term goals     Current Functional Status:   ADL  Feeding: Independent  Grooming: Independent  UE Bathing: Setup(OT covered R chest port site w/ tegaderm)  LE Bathing: Supervision, Verbal cueing(stood for entire shower (shower chair available if needed), pt used [facility] but required A to hang it on hook, gave min VCs to sit down to dry feet, tried to walk away from shower stall when drying legs but her cardiac monitor bag was tied to GB,)  UE Dressing: Independent( pt retrieved her clothes & donned IND'ly)  LE Dressing: Modified independent (pt gathered her pants, safety concerns while walking around in plain socks, but later donned her shoe;  alternated sitting & standing to don clothing over hips, no LOB)  Toileting: Modified independent (used GB intermittently; able to manage clothing down, up & wipes IND'ly)  Additional Comments: Stood in shower for bathing, OT had to manage cardiac monitor & cover R upper chest port, pt gathered own clothes & dressed LB while standing, No LOB or dizziness    Bed mobility  Bridging: Independent  Rolling to Left: Independent  Rolling to Right: Independent  Supine to Sit: Independent  Sit to Supine: Independent  Scooting: Independent  Comment: bed flat no rails    Functional Transfers: Toilet Transfers  Toilet - Technique: Ambulating  Equipment Used: Grab bars  Toilet Transfer: Modified independent  Toilet Transfers Comments: used GB w/ R hand         Shower Transfers  Shower - Transfer From: Other  Shower - Transfer Type: To and From  Shower - Transfer To: Standing  Shower - Technique: Ambulating  Shower Transfers: Modified independence  Shower Transfers Comments: completed shower transfer, did not use shower chair but was available; used GB intermittently    Car Transfers  Car - Transfer From: Other  Car - Technique: Ambulating  Car Transfers: Modified independence  Car Transfers: slow to process how to open car door, safety concerns as she stepped into the car, exiting, she tried to hold onto car door, no LOB; also able to demonstrate the safer method to enter car by sitting down bottom first      Functional Mobility  Functional - Mobility Device: No device  Activity: Retrieve items, Transport items, To/from bathroom  Assist Level: Modified independent   Functional Mobility Comments: pt given Room IND no AD; safety concerns when ambulating outside of her room, requires cues for pathfinding, no LOB    Instrumental ADL's  Instrumental ADLs: Yes  Meal Prep  Meal Prep Level: Other(no device)  Meal Prep Level of Assistance: Supervision  Meal Preparation: Pt prepared food on the stove. Pt correctly picked the burner and turned it on to the appropriate temperature. She was able to cook the item correctly and turned off the burner without cues. she was able to clean all dishes at the sink.   Pt demnonstrated good safety in the kitchen with all tasks. Money Management  Money Management Level of Assistance: Moderate assistance  Money Management: initially counting small groups of coins w/o difficulty ($1 or less amounts). More assist with counting greater than $1. -when numbers totaled for each coin written down and adding up total amount. Additional Activities: has expressive aphasia but yes/no responses seem accurate; did not require any VCs throughout ADLs; mild forgetfulness to navigate from therapy gym <>ortho gym. Pt completed ACLS, her score of 4.4 indicates the need to live w/ someone who does a daily check on the environment & removes any hazards and solves new problems. Person may be left alone for part of the day & manage a daily allowance. Pt has aphasia which will impact her ability to communicate her needs to others--she may not recognize errors unless clearly visible & may request help when mistakes are noticed, attention span is good up to 1 hr           Perception  Overall Perceptual Status: WFL(has good proprioception/body awareness)         UE Function:     Left Hand Strength -  (lbs)  Handle Setting 1: 63, 60, 65  Right Hand Strength -  (lbs)  Handle Setting 1: 72, 73, 85  Fine Motor Skills  Left 9 Hole Peg Test Time (secs): 24.28  Right 9 Hole Peg Test Time (secs): 24.49      Assessment:   Assessment: pt has met 5/6 goals w/ OT intervention. She achieved a MI/IND level w/ majority of ADLs, shower level bathing, toileting & household transfers w/o AD (has room IND sine 4/4/19). Pt's balance is good however she can be impulsive and requires VCs for pathfinding her way around the unit. Pt continues to be impaired w/ expressive & receptive aphasia, and displays cognitive deficits which currently impair her ability to drive or return to work. OT recommends she wait at least 3 months and will likely need 's evaluation.  Pt is being DC'ed home today after PM tx, and will receive [facility] OT, PT & SLP services, no DME needed  Prognosis: Good  Barriers to Learning: aphasia  REQUIRES OT FOLLOW UP: Yes  Discharge Recommendations: Home with assist PRN, S Level 2    Equipment Recommendations:  N/a--recommended pt wait at least 3 months before returning to work or driving--she displays cognitive and speech deficits which impact her safety--will need 's eval and work hardening programs prior to this        [facility]: LEVEL 2 SOCIAL      -Initial home health evaluation to occur within 24-48 hours, in patient home    -Home health agency to establish plan of care for patient over 60 day period    -Medication Reconciliation    -PCP Visit scheduled within seven days of discharge    -PT/OT to evaluate with goal of regaining prior level of functioning    -OT to evaluate if patient has [address] needs for personal care    - evaluation within 24-48 hours, includes evaluation of resources     and insurance to determine AL/IL/LTC/Medicaid options    -[place] [facility] Referral    -Dietician evaluation within five days of discharge    -Arnel Mora to discuss home support and care needs post   discharge.  This care conference will occur within two weeks of discharge       Home Exercise Program  Provided Pt with blue theraband    Electronically signed by MAXIMILIAN Ely/L #[id number] on 4/5/2019 at 2:28 PM

## 2019-04-05 NOTE — PROGRESS NOTES
Speech Language Pathology  ACUTE REHAB UNIT  SPEECH/LANGUAGE PATHOLOGY      [x] Daily  [] Weekly Care Conference Note  [x] Discharge    [facility]       :1960  CRT:[id number]  Rehab Dx/Hx: Acute ischemic stroke ([facility] [address] [I63.9]    Precautions: Aphasia-Communication; Medical  Home situation: Lives with dtr   Dx: [x] Aphasia  [] Dysarthria  [] Apraxia   [x] Oropharyngeal dysphagia [x] Cognitive   Impairment  [] Other:     Initial Speech Therapy Assessment Diagnosis  1. Cognitive Diagnosis: Ongoing assessment. Objective assessment is difficult due to severity of receptive and expressive langauge aphasia  2. Aphasia Diagnosis: Receptive and expressive language aphasia impacting all modalities. Pt's language processing improves with max visual demonstrations. Pt's verbal expression characterized by functional greetings and accasional automatics; but paraphasias, neologisms, perseverations impact expression of needs/wants. pt does attempt to use gestures to compensate. Pt was able to express basic biographical information via written expression. Pt however was unable to write to compensate for verbal expressive language problems. Pt does demonstrate reading processing at word/phrase level. Will intiate therapy  3. Speech Diagnosis: Right facial asym noted during Oral Motor Exam. Pt 's speech is intelligible; but dysphasia errors impact conveying needs/wants. No significant phonemic distortions  4. Dysphagia: Oropharyngeal Dysphagia characterized by prolonged, but functional mastication; variable disorganized A-P oral transit; and delayed initiation of swallow. Despite these problems; no overt clinical s/s of penetration ; no voice quality changes post swallow;no pt complaints post swallow.  Plan to request diet upgrade to regular consistency with thin liquids from dysphagia III    Date of Admit: 3/31/2019  Room #: B1M-4650/3257-01  Date: 2019       Current functional status (updated daily): Pt being seen for : [x] Speech/Language Treatment  [x] Dysphagia Treatment [x] Cognitive Treatment  [] Other:  Current Diet Order:DIET GENERAL;   Communication: []WFL  [x] Aphasia  [] Dysarthria  [] Apraxia  [] Pragmatic Impairment [] Non-verbal  [] Hearing Loss  [] Other:   Cognition: [] WFL  [] Mild  [] Moderate  [] Severe [] Unable to Assess  [] Other:  Memory: [] WFL  [] Mild  [] Moderate  [] Severe [] Unable to Assess  [] Other:  Behavior: [x] Alert  [x] Cooperative  []  Pleasant  [] Confused  [] Agitated  [] Uncooperative  [] Distractible [] Motivated  [] Self-Limiting [] Anxious  [] Other:  Endurance:  [x] Adequate for participation in SLP sessions  [] Reduced overall  [] Lethargic  [] Other:  Safety: [] No concerns at this time  [] Reduced insight into deficits  []  Reduced safety awareness [] Not following call light procedures  [] Unable to Assess  [x] Other: ongoing assessment 2/2 to Aphasia  Swallowing Precautions: Sit up for all meals and thereafter for 30 minutes, Dry swallow after each bite/sip and Check mouth for food residue after snacks and meals  Barriers toward progress: medical issues and caregiver support may be barriers  Discharge recommendations:  [] Home independently  [x] Home with assistance []  24 hour supervision  [] ECF [] Other:  Continued Tx Upon Discharge: ? [x] Yes [] No [] TBD based on progress while on ARU [] Vital Stim indicated [] Other:   Estimated discharge date: TBD         Date: 4/5/2019      Tx session 1 Tx session 2   Total Timed Code Min SLP Individual Minutes  Time In: 0730  Time Out: 0815  Minutes: 45  Coded treatment time  10 SLP Individual Minutes  Time In: 1430  Time Out: 1523  Minutes: 53  Coded treatment time  15   Group Treatment Minutes 0 0   Co-Treat Minutes 0 0   Variance/Reason:  n/a n/a   Pain Right leg pain level 3 denied   Pain Intervention [] RN notified  [] Repositioned  [x] Intervention offered and patient declined  [] N/A  [] Other: [] RN notified  [] Repositioned  [] Intervention offered and patient declined  [] N/A  [] Other:   Subjective     Pt seen bedside  Pt was awake in recliner   Pt seen in treatment office  Pt was receptive to therapy with good effort  Sibling present for therapy   Objective:   Portions of BDAE re-administered      Dysphagia Goals:  1. The patient will tolerate regular consistency with thin liquid diet without observed clinical signs of aspiration,  Not targeted and no new complaints No new complaints    Goal met with precautions   2. The patient/caregiver will demonstrate understanding of compensatory strategies for improved swallowing safety. Not targeted Reviewed:   -sitting up for meals  -reducing rate of intake  -aspiration precuations   Communiation Goals:   Pt was unable to convey goals but to get talking better. Therapy working toward pt goal Therapy worked toward pt goal   Goal 1: Pt will demonstrate improved language processing at word and concrete sentence level for Y/N responses with <moderate clarification   Visual Language Processing  -word/picture discrimination: 83% BDAE  -sentence level BDAE: 30%  -recognizing biographical information; family names;  Oncologist  and Cardiologist 's names    Auditory Processing  -body part discrimination: moderate demonstrations  -multiple unit Y/N question response accuracy regarding biographical information: 70%  -BDAE Y/N response to complex Ideational questions:<50%       Visual Language processing:  -functional activities for check writing using printed compensatory strategies  (page with months; printed $ amounts and written dollar amounts): set up and intermittent assist  -functional reading task at word level for categorization  (ie grocery list): set up and mild assist    Auditory processing (without visual cues)  -use of visual cues for conditioning to task.  -use of visual cues for home ed for suggested activities     Goal 2: Pt will demonstrate phonemic paraphasias. Pt with inconsistent awareness of jargon and phonemic errors    VErbal expression:   -CFN (orig BDAE) : 5/7 number amounts; 3/6 action words;0/6 letters without use of strategy;  2/6 by object label; 2/6 by color labels;     -serial sequence:   Numbers 1 - >25: IND  Alphabet IND  Weekdays (assisted)     Other areas targeted: Cognitive:   -Orientation (use of printed words in a choice format of 4-7): IND for place; full date; DX  -recall of 3 pc stimuli after a delay / distraction (using printed word [facility] format): 66% IND ; improved to 100% with contextual cue. Cognitive:   -see above regarding writing checks; categorization  -pt was able to use alphabet for improved CFN letters to 100%    Education:   Ongoing pt ed Ongoing pt ed   Safety Devices: [] Call light within reach  [] Chair alarm activated  [] Bed alarm activated  [x] Other: room independence [] Call light within reach  [] Chair alarm activated  [] Bed alarm activated  [x] Other: transport returned pt to room   Progress Assessment: 4/2/2019: continue treatment goals. Pt responding well to therapy and structured speech tasks incorporating varied assistive strategies. Sibling here for one of the sessions for family ed. Recommended diet upgrade to regular consistency  4/3/2019: continue daily progress  4/5/2019: PT /sibling ed in home activities; communication strategies; possible aphasia apps to explore (per request of sibling). Pt has made continued progress in structured therapy targeting listening/visual attention; receptive language aphasia remediation; verbal and written expressive language aphasia remediation; use of compensatory strategies for participation in cognitive-linguistic tasks. Prognosis for continued progress is excellent; guarded medical issues. Plan: [facility] for Dysphasia remediation and f/u regarding Oropharyngeal  And regular consistency food/thin liquid diet tolerance.   Potential need for BVR and

## 2019-04-06 VITALS
OXYGEN SATURATION: 99 % | WEIGHT: 184.97 LBS | BODY MASS INDEX: 27.4 KG/M2 | RESPIRATION RATE: 17 BRPM | HEIGHT: 69 IN | HEART RATE: 66 BPM | TEMPERATURE: 97.5 F | DIASTOLIC BLOOD PRESSURE: 73 MMHG | SYSTOLIC BLOOD PRESSURE: 108 MMHG

## 2019-04-06 PROCEDURE — 94760 N-INVAS EAR/PLS OXIMETRY 1: CPT

## 2019-04-06 PROCEDURE — 6360000002 HC RX W HCPCS: Performed by: PHYSICAL MEDICINE & REHABILITATION

## 2019-04-06 PROCEDURE — 6370000000 HC RX 637 (ALT 250 FOR IP): Performed by: PHYSICAL MEDICINE & REHABILITATION

## 2019-04-06 PROCEDURE — 94640 AIRWAY INHALATION TREATMENT: CPT

## 2019-04-06 RX ADMIN — METOPROLOL TARTRATE 12.5 MG: 25 TABLET ORAL at 08:18

## 2019-04-06 RX ADMIN — HYDROCORTISONE 20 MG: 10 TABLET ORAL at 08:18

## 2019-04-06 RX ADMIN — MOMETASONE FUROATE AND FORMOTEROL FUMARATE DIHYDRATE 2 PUFF: 200; 5 AEROSOL RESPIRATORY (INHALATION) at 08:08

## 2019-04-06 RX ADMIN — FLECAINIDE ACETATE 50 MG: 100 TABLET ORAL at 08:18

## 2019-04-06 RX ADMIN — CLONAZEPAM 0.5 MG: 0.5 TABLET ORAL at 08:18

## 2019-04-06 RX ADMIN — THERA TABS 1 TABLET: TAB at 08:17

## 2019-04-06 RX ADMIN — FOLIC ACID 1 MG: 1 TABLET ORAL at 08:18

## 2019-04-06 RX ADMIN — PANTOPRAZOLE SODIUM 40 MG: 40 TABLET, DELAYED RELEASE ORAL at 05:46

## 2019-04-06 RX ADMIN — ENOXAPARIN SODIUM 80 MG: 80 INJECTION SUBCUTANEOUS at 08:18

## 2019-04-06 ASSESSMENT — PAIN SCALES - GENERAL
PAINLEVEL_OUTOF10: 0
PAINLEVEL_OUTOF10: 0

## 2019-04-06 NOTE — PROGRESS NOTES
Verbal and written discharge instructions were given to patient, patient verbalizes understanding of discharge instructions including medication orders for home and possible side effects associated with them. Prescription for Lipitor called in to [facility] in [facility]. Verified with Dr. Tyra Leone that Lovenox was already available at the pharmacy, no need for prescription. Patient instructed and verbalizes understanding to call doctor listed if they should have any complications or worsening symptoms. Verbalizes understanding about follow-up appointments. Telemetry discontinued. Port de-accessed. Patient tolerated well, no signs of bleeding, dressing applied. Patient discharged home with all belonging. Out via wheelchair.

## 2019-04-06 NOTE — PROGRESS NOTES
Patient was admitted on 3/31 for acute ischemic stroke. Patient is A&O x4, with expressive aphasia. Patient does not complain of pain. Patient transfers with walker and is room independent. All PM medications were given without issue. Patient is continent of bowels and bladder. Last BM 4/4. Patient has right chest port. Patient is to be discharged 4/6. Patient is sleeping soundly all shift. Call light is within reach. Patient calls as needed.

## 2019-04-06 NOTE — DISCHARGE SUMMARY
independent and worked full time. Pt's main deficit is speech- global aphasia. Patient has met all LTG Pt. independent with  functional mobility using no device. Patient performed 12 steps with rail with MI  Pt. given room independence. Pt. to be D/C tomorrow , , with no home PT. Plan to see tthis afternoon to review HEP ,and safety with mobility. Occupational therapy: Eatin - Patient feeds self  Groomin - Patient independent with all grooming tasks  Bathin - Able to bathe all 10 areas with setup/sup/cues  Dressing-Upper: 7 - Patient independently dresses upper body  Dressing-Lower: 6 - Independent with device/prosthesis  Toiletin - Requires device (grab bar/walker/etc.)  Toilet Transfer: 6 - Independent with device (grab bar/walker/slide bar)  Shower Transfer: 6 - Modified independence,  , Assessment: pt has met 5/6 goals w/ OT intervention. She achieved a MI/IND level w/ majority of ADLs, shower level bathing, toileting & household transfers w/o AD (has room IND sine 19). Pt's balance is good however she can be impulsive and requires VCs for pathfinding her way around the unit. Pt continues to be impaired w/ expressive & receptive aphasia, and displays cognitive deficits which currently impair her ability to drive or return to work. OT recommends she wait at least 3 months and will likely need 's evaluation.  Pt is being DC'ed home today after PM tx, and will receive  OT, PT & SLP services, no DME needed    Speech therapy:  Comprehension: 6 - Complex ideas 90% or device (hearing aid/glasses)  Expression: 3 - Expresses basic ideas/needs 50-74% of the time  Social Interaction: 4 - Patient appropriate 75-90%+ of the time  Problem Solvin - Patient solves simple/routine tasks 75-90%+   Memory: 4 - Patient remembers 75-90%+ of the time      Inpatient Rehabilitation Course:   Radha Delcid is a 62 y.o. female admitted to inpatient rehabilitation on 3/31/2019 for s/p left middle cerebral artery stroke  The patient participated in an aggressive multidisciplinary inpatient rehabilitation program involving 3 hours per day, 5 days per week of rehabilitation. Patient is a 66-year-old female who is a known medical history of atrial fibrillation, metastatic adenocarcinoma of the left lung, on chemotherapy who developed the onset of right-sided weakness. Diagnosis of a left middle cerebral artery stroke was made, but no TPA was given since she was on Eliquis. CTA of the brain revealed a left MCA occlusion. Patient was taken to interventional radiology for thrombectomy. MRI scan revealed a left MCA distribution infarct. Patient was started on systemic Lovenox 7 days post stroke. Her aspirin was discontinued. Patient was found to have a mass in her heart on echocardiogram in September, 2018, and this condition disappeared on repeat echo in December, 2018 after she was treated with chemotherapy and anticoagulation. Patient scheduled for a repeat OC on April 4. Bilateral lower extremity Doppler revealed no DVT. Repeat CT scan of the chest revealed her known lung cancer, and oncology recommended Lovenox for anticoagulation treatment at this time. Patient had been taking Keytruda for her lung cancer. Patient started therapies, but needs more therapy before discharged to home so we can approve her right-sided strength and self-care activities. She also needs speech for treatment of her aphasia. Patient also has diagnosis of COPD, A. Fib, GERD, and known thyroid nodule-benign. She currently is on a dysphagia 3 diet with liquids. After admission to the Rehab Unit, she made steady progress in her therapies as listed above under each therapy section. Her upper and lower extremity coordination and strength did improve in therapy, and she was starting to improve with her endurance and functional status as she  participated in her rehab therapies.  Her balance, labs and blood pressure were monitored while on these medications    acetaminophen 325 MG tablet  Commonly known as:  TYLENOL     atorvastatin 40 MG tablet  Commonly known as:  LIPITOR  Take 1 tablet by mouth nightly  Notes to patient:  cholesterol     budesonide-formoterol 160-4.5 MCG/ACT Aero  Commonly known as:  SYMBICORT     clonazePAM 0.5 MG tablet  Commonly known as:  KLONOPIN     * CORTEF 10 MG tablet  Generic drug:  hydrocortisone  Notes to patient:  steriod     * hydrocortisone 10 MG tablet  Commonly known as:  CORTEF     enoxaparin 80 MG/0.8ML injection  Commonly known as:  LOVENOX     flecainide 50 MG tablet  Commonly known as:  TAMBOCOR     fluticasone 50 MCG/ACT nasal spray  Commonly known as:  FLONASE     folic acid 1 MG tablet  Commonly known as:  Kevin Lamar  Notes to patient:  supplement     lidocaine-prilocaine 2.5-2.5 % cream  Commonly known as:  EMLA     metoprolol tartrate 25 MG tablet  Commonly known as:  LOPRESSOR  Notes to patient:  Blood pressure/heart function     MULTIVITAMIN ADULT PO     NEXIUM 20 MG delayed release capsule  Generic drug:  esomeprazole     prochlorperazine 10 MG tablet  Commonly known as:  COMPAZINE     PROVENTIL  (90 Base) MCG/ACT inhaler  Generic drug:  albuterol sulfate HFA         * This list has 2 medication(s) that are the same as other medications prescribed for you. Read the directions carefully, and ask your doctor or other care provider to review them with you.                Where to Get Your Medications      You can get these medications from any pharmacy    Bring a paper prescription for each of these medications  · atorvastatin 40 MG tablet            I spent over 35 minutes on this discharge encounter between counseling, coordination of care, and medication reconciliation.         To comply with [address] [facility] bylaw R.II.[id number]:   Discharge order placed in advance to facilitate patients discharge needs      Shanti Sen MD, 4/6/2019, 11:05 AM

## 2019-04-06 NOTE — PLAN OF CARE
Problem: Falls - Risk of:  Goal: Will remain free from falls  Description  Will remain free from falls  Outcome: Ongoing  Note:   Patient is a medium fall risk. Patient free of falls this shift. Bed low and locked at all times. Call light and bedside table is within reach. Discussed with patient the need to call out for assistance before getting up when if needed. Patient verbalized understanding. Problem: SKIN INTEGRITY  Goal: LTG - Patient will be free from infection  Outcome: Ongoing  Note:   Pt is showing no signs and symptoms of infection. Pt educated on signs and symptoms of infection to watch out for. Pt is agreeable to notify nurse if signs start to show. Handwashing is performed upon entering patients to reduce the risk of infection. Will continue to assess and monitor. Problem: SKIN INTEGRITY  Goal: STG - patient will maintain good skin integrity  Outcome: Ongoing  Note:   Patient is at risk for impaired skin integrity. Offered to turn and reposition patient every two hours. Skin is assessed every shift and prn. Patient has pillow support, to help reduce skin breakdown. Barrier cream applied for incontinence as needed. Will continue to monitor. Problem: Pain:  Goal: Pain level will decrease  Description  Pain level will decrease  Outcome: Ongoing  Note:   Patient denies any pain at this time. Will continue to monitor.

## 2019-04-06 NOTE — PLAN OF CARE
Problem: Pain:  Goal: Control of acute pain  Description  Control of acute pain  Outcome: Met This Shift     Problem: Pain:  Goal: Control of chronic pain  Description  Control of chronic pain  Outcome: Met This Shift     Problem: Falls - Risk of:  Goal: Will remain free from falls  Description  Will remain free from falls  4/6/2019 1011 by Josh Vincent RN  Outcome: Ongoing  4/6/2019 0212 by Kiana Aguirre RN  Outcome: Ongoing  Note:   Patient is a medium fall risk. Patient free of falls this shift. Bed low and locked at all times. Call light and bedside table is within reach. Discussed with patient the need to call out for assistance before getting up when if needed. Patient verbalized understanding. Patient room independent, understands to call for assistance, if needed. Problem: HEMODYNAMIC STATUS  Goal: Patient has stable vital signs and fluid balance  Outcome: Ongoing     Problem: ACTIVITY INTOLERANCE/IMPAIRED MOBILITY  Goal: Mobility/activity is maintained at optimum level for patient  Outcome: Ongoing  Patient has completed therapy, discharging to home today.

## 2019-04-10 NOTE — CARE COORDINATION
[facility]  Received referral from case management [address] [place] [address] with patent's daughter Jia East regarding General acute hospital services.    Faxed orders to [address] care to see patient by   4/12/19  Celeste Roberts LPN    General acute hospital [facility] Cell [phone], Fax [phone]

## 2019-05-14 DIAGNOSIS — C34.90 MALIGNANT NEOPLASM OF LUNG, UNSPECIFIED LATERALITY, UNSPECIFIED PART OF LUNG (HCC): Primary | ICD-10-CM

## 2019-05-14 RX ORDER — HYDROCODONE BITARTRATE AND ACETAMINOPHEN 5; 325 MG/1; MG/1
1 TABLET ORAL EVERY 4 HOURS PRN
Qty: 24 TABLET | Refills: 0 | Status: SHIPPED | OUTPATIENT
Start: 2019-05-14 | End: 2019-05-21

## 2021-04-19 NOTE — PROGRESS NOTES
-- DO NOT REPLY / DO NOT REPLY ALL ---- Message is from the [facility]--    COVID-19 Universal Screening: N/A - Not about scheduling    General Patient Message      Reason for Call: Patient is calling stating that he would like for Dr. Otto to contact him regarding Medical questions .     Caller Information       Type Contact Phone    04/19/2021 11:07 AM CDT Phone (Incoming) Stan Arnett (Self) [phone] (M)          Alternative phone number: none    Turnaround time given to caller:   \"This message will be sent to [state Provider's name]. The clinical team will fulfill your request as soon as they review your message.\"     Caregiver Present: No  Referring Practitioner: Dr. Willy Perez: Patient has difficulty with expressing self, reorts no pain, unable to correctly state her birthday and year - verified with wristband, patient's correct . Sister reports and Art Rodrigues, reported earlier that patient is complaining of discomfort in her R thigh.            Orientation  Orientation  Overall Orientation Status: (difficult to assess due to global aphasia - see SP - unable to state correct  due to aphasia)  Cognition      Objective   Bed mobility  Bridging: Supervision  Rolling to Left: Supervision;Modified independent  Rolling to Right: Supervision;Modified independent  Supine to Sit: Supervision;Modified independent  Sit to Supine: Supervision;Modified independent  Scooting: Supervision;Modified independent  Comment: Patient does not understand commands , needs gestures to understand movement commands - difficulty understanding cues for bridging to scoot prior to rolling to side  Transfers  Sit to Stand: Supervision  Stand to sit: Supervision  Comment: automatic, no assistive device  Ambulation  Ambulation?: Yes  Ambulation 1  Surface: level tile  Device: No Device  Assistance: Stand by assistance  Quality of Gait: Patient ambulated with good hernandez, with minimally decreased clearance on R with occassional shuffle, limited weightshift over R LE with swing through of L LE, tends to ER R LE, generally steady with no loss of balance  Distance: 200' x 3  Comments: after longer ambulation to ortho gym to practice car transfer, telemetry alarmed with note of PVC and HR 94 bpm which improved to 59 bpm with seated rest   Stairs/Curb  Stairs?: Yes  Stairs  # Steps : 12  Stairs Height: 6\"  Rails: Bilateral  Curbs: 6\"  Device: No Device  Assistance: Stand by assistance  Comment: reciprocal gait pattern on steps with B rails, curb step with light CGA and no loss of balane                  Other Activities: Other (see comment)  Comment: Sister, Hafsa Boyer, asking about when patient will be able to walk to the bathroom on her own. Explained safety precautions in place, but offered to train her to be able to assist the patient in her room while she is visiting. Sister, Hafsa Boyer, instructed in use of gait belt and how to be prepared to grab gait belt if patient were to lose her balance. Ambulated 220', 100' x 2 with no device and sister providing SBA/supervision. In patient's room, sister, with supervision of PT, provided SBA/superivsion to patient with toileting and toilet transfers. Sister, Hafsa Boyer, is safe to assist patient in her room with no device and instructed on use of bed alarm and chair alarm to use when she is not present. Noted on whiteboard that Hafsa Boyer is safe to assist patient in room with no device and Shannan GAXIOLA, made aware. Daughter, Mariama Person, would like to be trained to assist patient as well, but will need to attend therapy session. Assessment   Body structures, Functions, Activity limitations: Decreased strength;Decreased endurance;Decreased sensation;Decreased balance;Decreased functional mobility   Assessment: Patient is a 63 y/o female with Dx of L MCA distribution infarct and is currently being treated for lung cancer. Patient lives with daughter, sister-in-law and grandchildren. Patient was independent and worked full time. Patient presents with global aphasia, requires gestures with repitition to understand commands. Patient requires no AD , transfers with SBA/supervision, ambulates 200' with no device with SBA/supervison. Patient able to perfrom 12 steps with rails and curb step with SBA, no loss of balance. Patient's sister, Hafsa Boyer, is safe to assist patient to/from bathroom while she is visiting with patient - emphasized need to continue to call for assistance when Hafsa Boyer is not present.   Patient will benefit from to acute inpatient rehab to address higher level balance activities and strengthening to improve overall mobility to independent. Patient will continue to see speech therapy twice a day and physical therapy once a day, since speech deficts are much more limiting than her functional mobility limitations at this time. Anticipate discharge to home with family on Friday, 4/5, after therapies or Saturday, 4/6. Treatment Diagnosis: decreased functional mobility  Specific instructions for Next Treatment: high level balance activities  Prognosis: Good  Patient Education: safety with fucntional mobility  Barriers to Learning: global aphasia  REQUIRES PT FOLLOW UP: Yes  Activity Tolerance  Activity Tolerance: Patient Tolerated treatment well  Activity Tolerance: less frustrated regarding communication limitations     Goals  Short term goals  Time Frame for Short term goals: 5-7 days  Short term goal 1: transfers independent including bed mobility  Short term goal 2: ambulate community distances without AD independently  Short term goal 3: performed 12 steps with rail with MI  Short term goal 4: curb step with MI  Long term goals  Time Frame for Long term goals : STG = LTG  Patient Goals   Patient goals : Patient's goal is difficult to verbalize unable to verbalize, but did say automatically that wants to get better    Plan    Plan  Times per week: 5-6x  Times per day: Daily  Plan weeks: < 1 week  Specific instructions for Next Treatment: high level balance activities  Current Treatment Recommendations: Strengthening, Balance Training, Functional Mobility Training, Transfer Training, Endurance Training, Gait Training, Stair training, Safety Education & Training, Home Exercise Program, Patient/Caregiver Education & Training, Equipment Evaluation, Education, & procurement  Plan Comment: PT will see once daily and speech will see twice - patient wishes to be discharged at the end of this week, either Friday, 4/5, after therapies or Saturday, 4/6. Safety Devices  Type of devices:  All fall

## 2021-05-10 NOTE — PATIENT CARE CONFERENCE
[facility] Outpatient Physical Therapy   [address] [place] [address] [address]   Phone: [phone]   Fax: [phone]    Physical Therapy Daily Treatment Note      Date:  5/10/2021  Patient Name:  Osvaldo Barrett    :  1953  MRN: [id number]  Physician:FABRIZIO Flannery MD      Insurance:[facility] medicare/medicaid   Medical Diagnosis: left knee arthoplasty   Rehab Codes: Z60.930  Onset date:20  Next Dr's appt.: TBD   Visit Count: 3/12   Cancel/No Show: 0/0    Subjective: The patient with no new complaints of (B) knee pain with the left greater than the right. She continues to have difficulty up and down steps and walking long distances. The left knee AROM is still very limited and painful with her home program.            Pain:  [x] Yes  [] No Location: left knee Pain Rating: (0-10 scale) 6/10  Pain altered Tx:  [x] No  [] Yes  Action:  Comments:    Objective:      ROM  ° A/P STRENGTH TESTS (+/-) Left Right Not Tested    Left Right Left Right Ant.  Drawer   []   Hip Flex     Post. Drawer   []   Ext     Lachmans   []   ER     Valgus Stress   []   IR     Varus Stress   []   ABD     Abdullahis   []   ADD     Apleys Comp.   []   Knee Flex 90 115   Apleys Dist.   []   Ext 0-5 5-0   Hip Scouring   []   Ankle DF     LAURYs   []   PF     Piriformis   []   INV     Kirks   []   EVER     Talor Tilt   []        Pat-Fem Grind   []       Modalities:   Exercise , Vaso compression  Precautions:falls   Exercises:  Exercise Reps/ Time Weight/ Level Completed  Today Comments   Neustep  5 min  Lv 1      SLR,SAQ, LAQ, Quad sets,  15 x 3   x    PROM knee extension, flexion  60'' x 2 each   x    Wedge  30 '' x 4      4 way hip  15 x       Foam Squats, heals, toes  15 x      Foam - balance  SLS, NBS, eyes open/closed  30'' x 1 each                           VC  15'   x Supine laying with LE elevation    Other:    Specific Instructions for next treatment:Add mor PRE's at tolerated       Assessment: [x] Progressing toward [facility]  Inpatient Rehabilitation  Weekly Team Conference Note      Date: 4/3/2019  Patient Name:  Sandar Singletary    MRN: [id number]  : 1960  Gender: female  Physician: Dr Bernetta Libman   Diagnosis: Acute ischemic stroke [facility]) [I63.9]    CASE MANAGEMENT  Assessment:   Goal is home. PHYSICAL THERAPY    Bed mobility  Bridging: Supervision  Rolling to Left: Supervision, Modified independent  Rolling to Right: Supervision, Modified independent  Supine to Sit: Supervision, Modified independent  Sit to Supine: Supervision, Modified independent  Scooting: Supervision, Modified independent  Comment: Patient does not understand commands , needs gestures to understand movement commands - difficulty understanding cues for bridging to scoot prior to rolling to side    Transfers:  Sit to Stand: Supervision  Stand to sit: Supervision  Bed to Chair: Stand by assistance  Comment: automatic, no assistive device    Ambulation 1  Surface: level tile  Device: No Device  Assistance: Stand by assistance  Quality of Gait: Patient ambulated with good hernandez, with minimally decreased clearance on R with occassional shuffle, limited weightshift over R LE with swing through of L LE, tends to ER R LE, generally steady with no loss of balance  Distance: 200' x 3  Comments: after longer ambulation to ortho gym to practice car transfer, telemetry alarmed with note of PVC and HR 94 bpm which improved to 59 bpm with seated rest     Stairs  # Steps : 12  Stairs Height: 6\"  Rails: Bilateral  Curbs: 6\"  Device: No Device  Assistance: Stand by assistance  Comment: reciprocal gait pattern on steps with B rails, curb step with light CGA and no loss of balane      Other Activities: Other (see comment)  Comment: Sister, Sherren Bismarkabby, asking about when patient will be able to walk to the bathroom on her own.   Explained safety precautions in place, but offered to train her to be able to assist the patient in her room while she is visiting. Sister, Harden Harada, instructed in use of gait belt and how to be prepared to grab gait belt if patient were to lose her balance. Ambulated 220', 100' x 2 with no device and sister providing SBA/supervision. In patient's room, sister, with supervision of PT, provided SBA/superivsion to patient with toileting and toilet transfers. Sister, Harden Harada, is safe to assist patient in her room with no device and instructed on use of bed alarm and chair alarm to use when she is not present. Noted on whiteboard that Harden Harada is safe to assist patient in room with no device and Shannan GAXIOLA, made aware. Daughter, Oscar Rosado, would like to be trained to assist patient as well, but will need to attend therapy session. FIMS:  Bed, Chair, Wheel Chair: 5 - Requires setup/supervision/cues  Walk: 5 - Supervision Requires standby supervision or cuing to walk at least 150 feet  Distance Walked: 200'  Stairs: 5- Supervision Requires supervision(e.g., standing by, cuing, or coaxing) to go up and down one flight of stairs    PT Equipment Recommendations  Equipment Needed: No    Assessment: Patient is a 61 y/o female with Dx of L MCA distribution infarct and is currently being treated for lung cancer. Patient lives with daughter, sister-in-law and grandchildren. Patient was independent and worked full time. Patient presents with global aphasia, requires gestures with repitition to understand commands. Patient requires no AD , transfers with SBA/supervision, ambulates 200' with no device with SBA/supervison. Patient able to perfrom 12 steps with rails and curb step with SBA, no loss of balance. Patient's sister, Harden Harada, is safe to assist patient to/from bathroom while she is visiting with patient - emphasized need to continue to call for assistance when Harden Harada is not present. Patient will benefit from to acute inpatient rehab to address higher level balance activities and strengthening to improve overall mobility to independent. Trace,MS,CCC,SLP 3574  Speech and Language Pathologist          OCCUPATIONAL THERAPY    ADL  Feeding: Independent  Grooming: Supervision  UE Bathing: Stand by assistance  LE Bathing: Stand by assistance  UE Dressing: Stand by assistance  LE Dressing: Stand by assistance  Additional Comments: Stood in shower for bathing, dressed LB while standing, No LOB    Bed mobility  Bridging: Supervision  Rolling to Left: Supervision, Modified independent  Rolling to Right: Supervision, Modified independent  Supine to Sit: Supervision, Modified independent  Sit to Supine: Supervision, Modified independent  Scooting: Supervision, Modified independent  Comment: Patient does not understand commands , needs gestures to understand movement commands - difficulty understanding cues for bridging to scoot prior to rolling to side    Functional Transfers:        Shower Transfers  Shower - Transfer From: (no device)  Shower - Transfer Type: To and From  Shower - Transfer To: Standing  Shower - Technique: Ambulating  Shower Transfers: Stand by assistance              UE Function:WFLs      FIMS:  Eatin - Feeds self with setup/supervision/cues and/or requires only setup/supervision/cues to perform tube feedings  Groomin - Requires setup/cues to do all tasks  Bathin - Able to bathe all 10 areas with setup/sup/cues  Dressing-Upper: 5 - Requires setup/supervision/cues and/or requires assist with presthesis/brace only  Dressing-Lower: 5 - Requires setup/supervision/cues and/or staff applies TEDS/prosthesis/brace only  Toiletin - Requires setup/supervision/cues         Assessment: Pt making good physical gains w/ OT intervention (she was evaluated on 19--goals set for IND x 1 wk). On 19 she  completing cooking task with SBA for balance and managing stove top burner, prep and clean up w/o difficulty. Pt completed money counting with cues, assist for more larger amount of coins to count (even when adding on paper).  Pt SBA strengthening, conditioning, neuromuscular re-education, attention tasks, working memory, compensatory strategies       Interdisciplinary Individualized Plan of Care Review:    · Continue Current Plan of Care: Yes    · Modifications:_____________________________    Special Needs in the Upcoming Week :    [x] Family/Caregiver Education  [] Home visit  []Therapeutic Pass   [] Consults:_______    [] Other;_______    Patient Rehab Team Goals for the Upcoming Week:  1. Pt will tolerate regular consistency diet without complications  2. Pt will demonstrate improved communication skills for basic DL situations on the unit with set up and < moderate cues  3. Improve standing balance and household distance ambulation with no device to allow patient to maximize independence with self-care activities as she progresses to her goal to \"get better\" - with limited ability to verbalize due to aphasia. Team Members Present at Conference:  Physician: Dr Lilliana Baig   : Sandie Cuenca [place]    Occupational Therapist: Jackie Hercules, OTR/L 200  Physical Therapist:  Marie Palmer, [address]  Speech Therapist: Silvana Garcia. Bryanna,MS,CCC,SLP 0100  Speech and Language Pathologist  Nurse:ROSALEE Farias RN, CRRN  Dietician: Ramin Sanders, STANLEY, LD   DEEPAK Hedrick     I led this team conference and I approve the established interdisciplinary plan of care as documented within the medical record of Paul Matamoros.     MD: Dr. Lilliana Baig